# Patient Record
Sex: MALE | Race: BLACK OR AFRICAN AMERICAN | NOT HISPANIC OR LATINO | Employment: FULL TIME | URBAN - METROPOLITAN AREA
[De-identification: names, ages, dates, MRNs, and addresses within clinical notes are randomized per-mention and may not be internally consistent; named-entity substitution may affect disease eponyms.]

---

## 2017-08-15 ENCOUNTER — OFFICE VISIT (OUTPATIENT)
Dept: URGENT CARE | Facility: CLINIC | Age: 57
End: 2017-08-15
Payer: MEDICAID

## 2017-08-15 VITALS
RESPIRATION RATE: 20 BRPM | HEIGHT: 74 IN | WEIGHT: 225 LBS | TEMPERATURE: 97 F | OXYGEN SATURATION: 97 % | HEART RATE: 55 BPM | DIASTOLIC BLOOD PRESSURE: 57 MMHG | SYSTOLIC BLOOD PRESSURE: 104 MMHG | BODY MASS INDEX: 28.88 KG/M2

## 2017-08-15 DIAGNOSIS — M25.512 ACUTE PAIN OF LEFT SHOULDER: Primary | ICD-10-CM

## 2017-08-15 DIAGNOSIS — S16.1XXA CERVICAL STRAIN, ACUTE, INITIAL ENCOUNTER: ICD-10-CM

## 2017-08-15 PROCEDURE — 3008F BODY MASS INDEX DOCD: CPT | Mod: S$GLB,,, | Performed by: NURSE PRACTITIONER

## 2017-08-15 PROCEDURE — 99203 OFFICE O/P NEW LOW 30 MIN: CPT | Mod: S$GLB,,, | Performed by: NURSE PRACTITIONER

## 2017-08-15 RX ORDER — NAPROXEN 500 MG/1
500 TABLET ORAL 2 TIMES DAILY WITH MEALS
Qty: 20 TABLET | Refills: 0 | Status: SHIPPED | OUTPATIENT
Start: 2017-08-15 | End: 2017-08-25

## 2017-08-15 RX ORDER — KETOROLAC TROMETHAMINE 30 MG/ML
30 INJECTION, SOLUTION INTRAMUSCULAR; INTRAVENOUS
Status: COMPLETED | OUTPATIENT
Start: 2017-08-15 | End: 2017-08-15

## 2017-08-15 RX ORDER — METAXALONE 800 MG/1
800 TABLET ORAL 3 TIMES DAILY PRN
Qty: 30 TABLET | Refills: 1 | Status: SHIPPED | OUTPATIENT
Start: 2017-08-15 | End: 2017-08-25

## 2017-08-15 RX ORDER — TRIPROLIDINE/PSEUDOEPHEDRINE 2.5MG-60MG
TABLET ORAL EVERY 6 HOURS PRN
COMMUNITY
End: 2018-03-29

## 2017-08-15 RX ORDER — KETOROLAC TROMETHAMINE 30 MG/ML
30 INJECTION, SOLUTION INTRAMUSCULAR; INTRAVENOUS
Status: DISCONTINUED | OUTPATIENT
Start: 2017-08-15 | End: 2017-08-15

## 2017-08-15 RX ADMIN — KETOROLAC TROMETHAMINE 30 MG: 30 INJECTION, SOLUTION INTRAMUSCULAR; INTRAVENOUS at 05:08

## 2017-08-15 NOTE — PATIENT INSTRUCTIONS
Understanding Cervical Strain    There are 7 bones (vertebrae) in the neck that are part of the spine. These are called the cervical spine. Cervical strain is a medical term for neck pain. The neck has several layers of muscles. These are connected with tendons to the cervical spine and other bones. Neck pain is often the result of injury to these muscles and tendons.  Causes of cervical strain  Different types of stress on the neck can damage muscles and tendons (soft tissues) and cause cervical strain. Cervical tissues can be damaged by:  · The neck being forced past its normal range of motion, such as in a car accident or sports injury  · Constant, low-level stress, such as from poor posture or a poorly set-up workspace  Symptoms of cervical strain  These may include:  · Neck pain or stiffness  · Pain in the shoulders or upper back  · Muscle spasms  · Headache, often starting at the base of the neck  · Irritability, difficulty concentrating, or sleeplessness  Treatment for cervical strain  This problem often gets better on its own. Treatments aim to reduce pain and inflammation and increase the range of motion of the neck. Possible treatments include:  · Over-the-counter or prescription pain medicine. These help relieve pain and inflammation.  · Stretching exercises to decrease neck stiffness.  · Massage to decrease neck stiffness.  · Cold or heat pack. These help reduce pain and swelling.  Call 911  Call emergency services right away if you have any of these:  · Face drooping or numbness  · Numbness or weakness, especially in the arms or on one side  · Slurred speech or difficulty speaking  · Blurred vision   When to call your healthcare provider  Call your healthcare provider right away if you have any of these:  · Fever of 100.4°F (38°C) or higher, or as directed  · Pain or stiffness that gets worse  · Symptoms that dont get better, or get worse  · Numbness, tingling, weakness or shooting pains into the  arms or legs  · New symptoms  Date Last Reviewed: 3/10/2016  © 0354-5368 [employer]. [address] [place] [address], [place] [address]. All rights reserved. This information is not intended as a substitute for professional medical care. Always follow your healthcare professional's instructions.        Shoulder Contusion  You have a shoulder injury called a contusion. This causes pain, swelling, and sometimes bruising on the skin. You dont have any broken bones. This injury will take from a few days to several weeks to heal, depending on how severe it is. Moderate to severe shoulder contusions are treated with a sling or shoulder immobilizer. Minor contusions can be treated without any special support.  Home care  Follow these tips when caring for yourself at home:  · If you were given a sling to use, leave it in place for the time advised by your healthcare provider. If you arent sure how long to wear it, ask for advice. If the sling becomes loose, adjust it so that your forearm is level with the ground. Your shoulder should feel well supported.  · Put an ice pack on the injured area for 20 minutes every 1 to 2 hours the first day. You can make your own ice pack by putting ice cubes in a plastic bag. Wrap the bag in a thin towel. Continue with ice packs 3 to 4 times a day for the next 2 days. Then use the pack as needed to ease pain and swelling.  · You may use acetaminophen or ibuprofen to control pain, unless another pain medicine was prescribed. If you have chronic liver or kidney disease, talk with your healthcare provider before using these medicines. Also talk with your provider if youve ever had a stomach ulcer or GI bleeding.  · Shoulder and elbow joints become stiff if left in a sling for too long. You should start range of motion exercises about 7 to 10 days after the injury. Talk with your provider to find out what type of exercises to do and how soon to start.  · Unless your provider told you  otherwise, you can take the sling off to shower or bathe.  Follow-up care  Follow up with your healthcare provider if you dont start getting better in the next 5 days.  When to seek medical advice  Call your healthcare provider right away if any of these occur:  · Pain or swelling gets worse or continues for more than a few days  · Large amount of bruising on your shoulder or upper arm  · Your hand or fingers become cold, blue, numb, or tingly  · Difficulty moving your hand or fingers  · Weakness in your hand or fingers  · Your shoulder becomes stiff  · Your shoulder feels like it is popping out  · You arent able to do your daily activities  Date Last Reviewed: 10/1/2016  © 0895-7481 [employer]. [address] [place] [address], [place] [address]. All rights reserved. This information is not intended as a substitute for professional medical care. Always follow your healthcare professional's instructions.      Please return here or go to the Emergency Department for any concerns or worsening of condition.  If you were prescribed antibiotics, please take them to completion.  If you were prescribed a narcotic medication, do not drive or operate heavy equipment or machinery while taking these medications.  Please follow up with your primary care doctor or specialist as needed.    If you  smoke, please stop smoking.

## 2017-08-15 NOTE — PROGRESS NOTES
"Subjective:       Patient ID: Bear Campa is a 56 y.o. male.    Vitals:  height is 6' 2" (1.88 m) and weight is 102.1 kg (225 lb). His tympanic temperature is 96.9 °F (36.1 °C). His blood pressure is 104/57 (abnormal) and his pulse is 55 (abnormal). His respiration is 20 and oxygen saturation is 97%.     Chief Complaint: Shoulder Pain    This is a 56 y.o. male with History reviewed. No pertinent past medical history.    who presents today with a chief complaint of left shoulder pain.         Shoulder Pain    The pain is present in the left shoulder. The current episode started in the past 7 days. The problem occurs constantly. The pain is at a severity of 10/10. The pain is severe. Associated symptoms include a limited range of motion. Pertinent negatives include no fever or headaches. He has tried NSAIDS for the symptoms. The treatment provided no relief.     Review of Systems   Constitution: Negative for chills and fever.   HENT: Negative for headaches and sore throat.    Eyes: Negative for blurred vision.   Cardiovascular: Negative for chest pain.   Respiratory: Negative for shortness of breath.    Skin: Negative for rash.   Musculoskeletal: Positive for joint pain. Negative for back pain.   Gastrointestinal: Negative for abdominal pain, diarrhea, nausea and vomiting.   Psychiatric/Behavioral: The patient is not nervous/anxious.        Objective:      Physical Exam   Constitutional: He is oriented to person, place, and time. He appears well-developed and well-nourished. He is cooperative.  Non-toxic appearance. He does not appear ill. No distress.   HENT:   Head: Normocephalic and atraumatic. Head is without abrasion, without contusion and without laceration.   Right Ear: Hearing, tympanic membrane, external ear and ear canal normal. No hemotympanum.   Left Ear: Hearing, tympanic membrane, external ear and ear canal normal. No hemotympanum.   Nose: Nose normal. No mucosal edema, rhinorrhea or nasal " deformity. No epistaxis. Right sinus exhibits no maxillary sinus tenderness and no frontal sinus tenderness. Left sinus exhibits no maxillary sinus tenderness and no frontal sinus tenderness.   Mouth/Throat: Uvula is midline, oropharynx is clear and moist and mucous membranes are normal. No trismus in the jaw. Normal dentition. No uvula swelling. No posterior oropharyngeal erythema.   Eyes: Conjunctivae, EOM and lids are normal. Pupils are equal, round, and reactive to light. Right eye exhibits no discharge. Left eye exhibits no discharge. No scleral icterus.   Sclera clear bilat   Neck: Trachea normal, normal range of motion, full passive range of motion without pain and phonation normal. Neck supple. No spinous process tenderness and no muscular tenderness present. No neck rigidity. No tracheal deviation present.   Cardiovascular: Normal rate, regular rhythm, normal heart sounds, intact distal pulses and normal pulses.    Pulmonary/Chest: Effort normal and breath sounds normal. No respiratory distress.   Abdominal: Soft. Normal appearance and bowel sounds are normal. He exhibits no distension, no pulsatile midline mass and no mass. There is no tenderness.   Musculoskeletal: He exhibits no edema or deformity.        Right shoulder: He exhibits pain. He exhibits no tenderness.        Cervical back: He exhibits decreased range of motion, tenderness (LEFT CERVICAL AREA) and pain.   Neurological: He is alert and oriented to person, place, and time. He has normal strength. No cranial nerve deficit or sensory deficit. He exhibits normal muscle tone. He displays no seizure activity. Coordination normal. GCS eye subscore is 4. GCS verbal subscore is 5. GCS motor subscore is 6.   Skin: Skin is warm, dry and intact. Capillary refill takes less than 2 seconds. No abrasion, no bruising, no burn, no ecchymosis and no laceration noted. He is not diaphoretic. No pallor.   Psychiatric: He has a normal mood and affect. His speech  is normal and behavior is normal. Judgment and thought content normal. Cognition and memory are normal.   Nursing note and vitals reviewed.    X-ray images reviewed prior to patient discharge. Full report to be interpreted by Radiologist. Appropriate action to be taken if abnormalities noted.   Assessment:       1. Acute pain of left shoulder    2. Cervical strain, acute, initial encounter        Plan:         Acute pain of left shoulder  -     X-Ray Shoulder 2 or More Views Left; Future; Expected date: 08/15/2017  -     ketorolac injection 30 mg; Inject 30 mg into the vein one time.  -     naproxen (NAPROSYN) 500 MG tablet; Take 1 tablet (500 mg total) by mouth 2 (two) times daily with meals.  Dispense: 20 tablet; Refill: 0    Cervical strain, acute, initial encounter  -     ketorolac injection 30 mg; Inject 30 mg into the vein one time.  -     metaxalone (SKELAXIN) 800 MG tablet; Take 1 tablet (800 mg total) by mouth 3 (three) times daily as needed for Pain.  Dispense: 30 tablet; Refill: 1    Please return here or go to the Emergency Department for any concerns or worsening of condition.  If you were prescribed antibiotics, please take them to completion.  If you were prescribed a narcotic medication, do not drive or operate heavy equipment or machinery while taking these medications.  Please follow up with your primary care doctor or specialist as needed.    If you  smoke, please stop smoking.

## 2018-03-10 ENCOUNTER — OFFICE VISIT (OUTPATIENT)
Dept: URGENT CARE | Facility: CLINIC | Age: 58
End: 2018-03-10
Payer: MEDICAID

## 2018-03-10 VITALS
WEIGHT: 205 LBS | SYSTOLIC BLOOD PRESSURE: 112 MMHG | RESPIRATION RATE: 12 BRPM | TEMPERATURE: 98 F | HEART RATE: 71 BPM | DIASTOLIC BLOOD PRESSURE: 67 MMHG | OXYGEN SATURATION: 99 % | HEIGHT: 73 IN | BODY MASS INDEX: 27.17 KG/M2

## 2018-03-10 DIAGNOSIS — M54.12 CERVICAL RADICULOPATHY: Primary | ICD-10-CM

## 2018-03-10 PROCEDURE — 99214 OFFICE O/P EST MOD 30 MIN: CPT | Mod: S$GLB,,, | Performed by: PHYSICIAN ASSISTANT

## 2018-03-10 RX ORDER — METHYLPREDNISOLONE 4 MG/1
TABLET ORAL
Qty: 1 PACKAGE | Refills: 0 | Status: SHIPPED | OUTPATIENT
Start: 2018-03-10 | End: 2018-03-29

## 2018-03-10 NOTE — PROGRESS NOTES
"Subjective:       Patient ID: Bear Campa is a 57 y.o. male.    Vitals:  height is 6' 1" (1.854 m) and weight is 93 kg (205 lb). His tympanic temperature is 98.1 °F (36.7 °C). His blood pressure is 112/67 and his pulse is 71. His respiration is 12 and oxygen saturation is 99%.     Chief Complaint: Arm Pain    The patient is a 57 y.o. male with left sided arm pain, tingling and numbness. Patient reports it started in his neck/shoulder area and traveling down to his left hand to pinky, ring and middle finger. Patient reports the numbness is intermittent. Patient states this began two weeks ago while he was lifting heavy object.     Denies taking any medication for pain or numbness. Denies this issue has ever occurred before.    PMH:  - Denies    Meds:  - Denies    NKDA            Arm Pain    The incident occurred more than 1 week ago. The incident occurred at home. There was no injury mechanism. The pain is at a severity of 10/10. The pain is severe. Associated symptoms include numbness (left side, travels from shoulder down to hand) and tingling. Pertinent negatives include no chest pain. The symptoms are aggravated by movement.     Review of Systems   Constitution: Negative for chills and fever.   HENT: Negative for sore throat.    Eyes: Negative for blurred vision.   Cardiovascular: Negative for chest pain.   Respiratory: Negative for shortness of breath.    Skin: Negative for rash.   Musculoskeletal: Negative for back pain and joint pain.   Gastrointestinal: Negative for abdominal pain, diarrhea, nausea and vomiting.   Neurological: Positive for numbness (left side, travels from shoulder down to hand), paresthesias (L upper extremity ) and tingling. Negative for headaches.   Psychiatric/Behavioral: The patient is not nervous/anxious.        Objective:      Physical Exam   Constitutional: He is oriented to person, place, and time. He appears well-developed and well-nourished. He is cooperative.  Non-toxic " appearance. He does not appear ill. No distress.   HENT:   Head: Normocephalic and atraumatic.   Right Ear: Hearing, tympanic membrane, external ear and ear canal normal.   Left Ear: Hearing, tympanic membrane, external ear and ear canal normal.   Nose: Nose normal. No mucosal edema, rhinorrhea or nasal deformity. No epistaxis. Right sinus exhibits no maxillary sinus tenderness and no frontal sinus tenderness. Left sinus exhibits no maxillary sinus tenderness and no frontal sinus tenderness.   Mouth/Throat: Uvula is midline, oropharynx is clear and moist and mucous membranes are normal. No trismus in the jaw. Normal dentition. No uvula swelling. No posterior oropharyngeal erythema.   Eyes: Conjunctivae and lids are normal. Right eye exhibits no discharge. Left eye exhibits no discharge. No scleral icterus.   Sclera clear bilat   Neck: Trachea normal, normal range of motion, full passive range of motion without pain and phonation normal. Neck supple.   Cardiovascular: Normal rate, regular rhythm, normal heart sounds, intact distal pulses and normal pulses.    Pulmonary/Chest: Effort normal and breath sounds normal. No respiratory distress.   Abdominal: Soft. Normal appearance and bowel sounds are normal. He exhibits no distension, no pulsatile midline mass and no mass. There is no tenderness.   Musculoskeletal: Normal range of motion. He exhibits no edema or deformity.        Left shoulder: Normal. He exhibits normal range of motion, no tenderness and no deformity.        Left elbow: Normal.        Left wrist: Normal. He exhibits normal range of motion.   Normal abduction and adduction of shoulder, forearm and hand.     Normal finger-thumb opposition     Neurological: He is alert and oriented to person, place, and time. He exhibits normal muscle tone. Coordination normal.   Skin: Skin is warm, dry and intact. He is not diaphoretic. No pallor.   Psychiatric: He has a normal mood and affect. His speech is normal and  behavior is normal. Judgment and thought content normal. Cognition and memory are normal.   Nursing note and vitals reviewed.      Assessment:       1. Cervical radiculopathy        Plan:         Cervical radiculopathy  -     methylPREDNISolone (MEDROL DOSEPACK) 4 mg tablet; use as directed  Dispense: 1 Package; Refill: 0  -     Ambulatory referral to Internal Medicine      Patient Instructions     Please return here or go to the Emergency Department for any concerns or worsening of condition.  Please follow up with your primary care doctor or specialist as needed.    If you  smoke, please stop smoking.        General Neck and Back Pain    Both neck and back pain are usually caused by injury to the muscles or ligaments of the spine. Sometimes the disks that separate each bone of the spine may cause pain by pressing on a nearby nerve. Back and neck pain may appear after a sudden twisting or bending force (such as in a car accident), or sometimes after a simple awkward movement. In either case, muscle spasm is often present and adds to the pain.  Acute neck and back pain usually gets better in 1 to 2 weeks. Pain related to disk disease, arthritis in the spinal joints or spinal stenosis (narrowing of the spinal canal) can become chronic and last for months or years.  Back and neck pain are common problems. Most people feel better in 1 or 2 weeks, and most of the rest in 1 to 2 months. Most people can remain active.  People experience and describe pain differently.  · Pain can be sharp, stabbing, shooting, aching, cramping, or burning  · Movement, standing, bending, lifting, sitting, or walking may worsen the pain  · Pain can be localized to one spot or area, or it can be more generalized  · Pain can spread or radiate upwards, downwards, to the front, or go down your arms  · Muscle spasm may occur.  Most of the time mechanical problems with the muscles or spine cause the pain. it is usually caused by an injury, whether  known or not, to the muscles or ligaments. While illnesses can cause back pain, it is usually not caused by a serious illness. Pain is usually related to physical activity, whether sports, exercise, work, or normal activity. Sometimes it can occur without an identifiable cause. This can happen simply by stretching or moving wrong, without noting pain at the time. Other causes include:  · Overexertion, lifting, pushing, pulling incorrectly or too aggressively.  · Sudden twisting, bending or stretching from an accident (car or fall), or accidental movement.  · Poor posture  · Poor conditioning, lack of regular exercise  · Spinal disc disease or arthritis  · Stress  · Pregnancy, or illness like appendicitis, bladder or kidney infection, pelvic infections   Home care  · For neck pain: Use a comfortable pillow that supports the head and keeps the spine in a neutral position. The position of the head should not be tilted forward or backward.  · When in bed, try to find a position of comfort. A firm mattress is best. Try lying flat on your back with pillows under your knees. You can also try lying on your side with your knees bent up towards your chest and a pillow between your knees.  · At first, do not try to stretch out the sore spots. If there is a strain, it is not like the good soreness you get after exercising without an injury. In this case, stretching may make it worse.  · Avoid prolonged sitting, long car rides or travel. This puts more stress on the lower back than standing or walking.  · During the first 24 to 72 hours after an injury, apply an ice pack to the painful area for 20 minutes and then remove it for 20 minutes over a period of 60 to 90 minutes or several times a day.   · You can alternate ice and heat therapies. Talk with your healthcare provider about the best treatment for your back or neck pain. As a safety precaution, do not use a heating pad at bedtime. Sleeping with a heating pad can lead to  skin burns or tissue damage.  · Therapeutic massage can help relax the back and neck muscles without stretching them.  · Be aware of safe lifting methods and do not lift anything over 15 pounds until all the pain is gone.  Medications  Talk to your healthcare provider before using medicine, especially if you have other medical problems or are taking other medicines.  · You may use over-the-counter medicine to control pain, unless another pain medicine was prescribed. If you have chronic conditions like diabetes, liver or kidney disease, stomach ulcers,  gastrointestinal bleeding, or are taking blood thinner medicines.  · Be careful if you are given pain medicines, narcotics, or medicine for muscle spasm. They can cause drowsiness, and can affect your coordination, reflexes, and judgment. Do not drive or operate heavy machinery.  Follow-up care  Follow up with your healthcare provider, or as advised. Physical therapy or further tests may be needed.  If X-rays were taken, you will be notified of any new findings that may affect your care.  Call 911  Seek emergency medical care if any of the following occur:  · Trouble breathing  · Confusion  · Very drowsy or trouble awakening  · Fainting or loss of consciousness  · Rapid or very slow heart rate  · Loss of bowel or bladder control  When to seek medical advice  Call your healthcare provider right away if any of these occur:  · Pain becomes worse or spreads into your arms or legs  · Weakness, numbness or pain in one or both arms or legs  · Numbness in the groin area  · Difficulty walking  · Fever of 100.4ºF (38ºC) or higher, or as directed by your healthcare provider  Date Last Reviewed: 7/1/2016  © 0111-8350 [employer], [employer]. [address] [place] [address], [place] [address]. All rights reserved. This information is not intended as a substitute for professional medical care. Always follow your healthcare professional's instructions.        Understanding Cervical  Radiculopathy    Cervical radiculopathy is irritation or inflammation of a nerve root in the neck. It causes neck pain and other symptoms that may spread into the chest or down the arm. To understand this condition, it helps to understand the parts of the spine:  · Vertebrae. These are bones that stack to form the spine. The cervical spine contains the 7 vertebrae in the neck.  · Disks. These are soft pads of tissue between the vertebrae. They act as shock absorbers for the spine.  · The spinal canal. This is a tunnel formed within the stacked vertebrae. The spinal cord runs through this canal.  · Nerves. These branch off the spinal cord. As they leave the spinal canal, nerves pass through openings between the vertebrae. The nerve root is the part of the nerve that is closest to the spinal cord.   With cervical radiculopathy, nerve roots in the neck become irritated. This leads to pain and symptoms that can travel to the nerves that go from the spinal cord down the arms and into the torso.  What causes cervical radiculopathy?  Aging, injury, poor posture, and other issues can lead to problems in the neck. These problems may then irritate nerve roots. These include:  · Damage to a disk in the cervical spine. The damaged disk may then press on nearby nerve roots.  · Degeneration from wear and tear, and aging. This can lead to narrowing (stenosis) of the openings between the vertebrae. The narrowed openings press on nerve roots as they leave the spinal canal.  · An unstable spine. This is when a vertebra slips forward. It can then press on a nerve root.  There are other, less common causes of pressure on nerves in the neck. These include infection, cysts, and tumors.  Symptoms of cervical radiculopathy  These include:  · Neck pain  · Pain, numbness, tingling, or weakness that travels down the arm  · Loss of neck movement  · Muscle spasms  Treatment for cervical radiculopathy  In most cases, your healthcare provider  will first try treatments that help relieve symptoms. These may include:  · Prescription or over-the-counter pain medicines. These help relieve pain and swelling.  · Cold packs. These help reduce pain.  · Resting. This involves avoiding positions and activities that increase pain.  · Neck brace (cervical collar). This can help relieve inflammation and pain.  · Physical therapy, including exercises and stretches. This can help decrease pain and increase movement and function.  · Shots of medicinesaround the nerve roots. This is done to help relieve symptoms for a time.  In some cases, your healthcare provider may advise surgery to fix the underlying problem. This depends on the cause, the symptoms, and how long the pain has lasted.  Possible complications  Over time, an irritated and inflamed nerve may become damaged. This may lead to long-lasting (permanent) numbness or weakness. If symptoms change suddenly or get worse, be sure to let your healthcare provider know.     When to call your healthcare provider  Call your healthcare provider right away if you have any of these:  · New pain or pain that gets worse  · New or increasing weakness, numbness, or tingling in your arm or hand  · Bowel or bladder changes   Date Last Reviewed: 3/10/2016  © 9912-4072 [employer]. [address] [place] [address], [place] [address]. All rights reserved. This information is not intended as a substitute for professional medical care. Always follow your healthcare professional's instructions.

## 2018-03-10 NOTE — PATIENT INSTRUCTIONS
Please return here or go to the Emergency Department for any concerns or worsening of condition.  Please follow up with your primary care doctor or specialist as needed.    If you  smoke, please stop smoking.        General Neck and Back Pain    Both neck and back pain are usually caused by injury to the muscles or ligaments of the spine. Sometimes the disks that separate each bone of the spine may cause pain by pressing on a nearby nerve. Back and neck pain may appear after a sudden twisting or bending force (such as in a car accident), or sometimes after a simple awkward movement. In either case, muscle spasm is often present and adds to the pain.  Acute neck and back pain usually gets better in 1 to 2 weeks. Pain related to disk disease, arthritis in the spinal joints or spinal stenosis (narrowing of the spinal canal) can become chronic and last for months or years.  Back and neck pain are common problems. Most people feel better in 1 or 2 weeks, and most of the rest in 1 to 2 months. Most people can remain active.  People experience and describe pain differently.  · Pain can be sharp, stabbing, shooting, aching, cramping, or burning  · Movement, standing, bending, lifting, sitting, or walking may worsen the pain  · Pain can be localized to one spot or area, or it can be more generalized  · Pain can spread or radiate upwards, downwards, to the front, or go down your arms  · Muscle spasm may occur.  Most of the time mechanical problems with the muscles or spine cause the pain. it is usually caused by an injury, whether known or not, to the muscles or ligaments. While illnesses can cause back pain, it is usually not caused by a serious illness. Pain is usually related to physical activity, whether sports, exercise, work, or normal activity. Sometimes it can occur without an identifiable cause. This can happen simply by stretching or moving wrong, without noting pain at the time. Other causes  include:  · Overexertion, lifting, pushing, pulling incorrectly or too aggressively.  · Sudden twisting, bending or stretching from an accident (car or fall), or accidental movement.  · Poor posture  · Poor conditioning, lack of regular exercise  · Spinal disc disease or arthritis  · Stress  · Pregnancy, or illness like appendicitis, bladder or kidney infection, pelvic infections   Home care  · For neck pain: Use a comfortable pillow that supports the head and keeps the spine in a neutral position. The position of the head should not be tilted forward or backward.  · When in bed, try to find a position of comfort. A firm mattress is best. Try lying flat on your back with pillows under your knees. You can also try lying on your side with your knees bent up towards your chest and a pillow between your knees.  · At first, do not try to stretch out the sore spots. If there is a strain, it is not like the good soreness you get after exercising without an injury. In this case, stretching may make it worse.  · Avoid prolonged sitting, long car rides or travel. This puts more stress on the lower back than standing or walking.  · During the first 24 to 72 hours after an injury, apply an ice pack to the painful area for 20 minutes and then remove it for 20 minutes over a period of 60 to 90 minutes or several times a day.   · You can alternate ice and heat therapies. Talk with your healthcare provider about the best treatment for your back or neck pain. As a safety precaution, do not use a heating pad at bedtime. Sleeping with a heating pad can lead to skin burns or tissue damage.  · Therapeutic massage can help relax the back and neck muscles without stretching them.  · Be aware of safe lifting methods and do not lift anything over 15 pounds until all the pain is gone.  Medications  Talk to your healthcare provider before using medicine, especially if you have other medical problems or are taking other medicines.  · You may  use over-the-counter medicine to control pain, unless another pain medicine was prescribed. If you have chronic conditions like diabetes, liver or kidney disease, stomach ulcers,  gastrointestinal bleeding, or are taking blood thinner medicines.  · Be careful if you are given pain medicines, narcotics, or medicine for muscle spasm. They can cause drowsiness, and can affect your coordination, reflexes, and judgment. Do not drive or operate heavy machinery.  Follow-up care  Follow up with your healthcare provider, or as advised. Physical therapy or further tests may be needed.  If X-rays were taken, you will be notified of any new findings that may affect your care.  Call 911  Seek emergency medical care if any of the following occur:  · Trouble breathing  · Confusion  · Very drowsy or trouble awakening  · Fainting or loss of consciousness  · Rapid or very slow heart rate  · Loss of bowel or bladder control  When to seek medical advice  Call your healthcare provider right away if any of these occur:  · Pain becomes worse or spreads into your arms or legs  · Weakness, numbness or pain in one or both arms or legs  · Numbness in the groin area  · Difficulty walking  · Fever of 100.4ºF (38ºC) or higher, or as directed by your healthcare provider  Date Last Reviewed: 7/1/2016  © 2817-8901 [employer]. [address] [place] [address], [place] [address]. All rights reserved. This information is not intended as a substitute for professional medical care. Always follow your healthcare professional's instructions.        Understanding Cervical Radiculopathy    Cervical radiculopathy is irritation or inflammation of a nerve root in the neck. It causes neck pain and other symptoms that may spread into the chest or down the arm. To understand this condition, it helps to understand the parts of the spine:  · Vertebrae. These are bones that stack to form the spine. The cervical spine contains the 7 vertebrae in the  neck.  · Disks. These are soft pads of tissue between the vertebrae. They act as shock absorbers for the spine.  · The spinal canal. This is a tunnel formed within the stacked vertebrae. The spinal cord runs through this canal.  · Nerves. These branch off the spinal cord. As they leave the spinal canal, nerves pass through openings between the vertebrae. The nerve root is the part of the nerve that is closest to the spinal cord.   With cervical radiculopathy, nerve roots in the neck become irritated. This leads to pain and symptoms that can travel to the nerves that go from the spinal cord down the arms and into the torso.  What causes cervical radiculopathy?  Aging, injury, poor posture, and other issues can lead to problems in the neck. These problems may then irritate nerve roots. These include:  · Damage to a disk in the cervical spine. The damaged disk may then press on nearby nerve roots.  · Degeneration from wear and tear, and aging. This can lead to narrowing (stenosis) of the openings between the vertebrae. The narrowed openings press on nerve roots as they leave the spinal canal.  · An unstable spine. This is when a vertebra slips forward. It can then press on a nerve root.  There are other, less common causes of pressure on nerves in the neck. These include infection, cysts, and tumors.  Symptoms of cervical radiculopathy  These include:  · Neck pain  · Pain, numbness, tingling, or weakness that travels down the arm  · Loss of neck movement  · Muscle spasms  Treatment for cervical radiculopathy  In most cases, your healthcare provider will first try treatments that help relieve symptoms. These may include:  · Prescription or over-the-counter pain medicines. These help relieve pain and swelling.  · Cold packs. These help reduce pain.  · Resting. This involves avoiding positions and activities that increase pain.  · Neck brace (cervical collar). This can help relieve inflammation and pain.  · Physical  therapy, including exercises and stretches. This can help decrease pain and increase movement and function.  · Shots of medicinesaround the nerve roots. This is done to help relieve symptoms for a time.  In some cases, your healthcare provider may advise surgery to fix the underlying problem. This depends on the cause, the symptoms, and how long the pain has lasted.  Possible complications  Over time, an irritated and inflamed nerve may become damaged. This may lead to long-lasting (permanent) numbness or weakness. If symptoms change suddenly or get worse, be sure to let your healthcare provider know.     When to call your healthcare provider  Call your healthcare provider right away if you have any of these:  · New pain or pain that gets worse  · New or increasing weakness, numbness, or tingling in your arm or hand  · Bowel or bladder changes   Date Last Reviewed: 3/10/2016  © 3618-9290 [employer]. [address] [place] [address], [place] [address]. All rights reserved. This information is not intended as a substitute for professional medical care. Always follow your healthcare professional's instructions.

## 2018-03-29 ENCOUNTER — HOSPITAL ENCOUNTER (EMERGENCY)
Facility: HOSPITAL | Age: 58
Discharge: HOME | End: 2018-03-29
Attending: EMERGENCY MEDICINE
Payer: MEDICAID

## 2018-03-29 VITALS
SYSTOLIC BLOOD PRESSURE: 127 MMHG | HEIGHT: 73 IN | BODY MASS INDEX: 27.83 KG/M2 | RESPIRATION RATE: 18 BRPM | DIASTOLIC BLOOD PRESSURE: 60 MMHG | OXYGEN SATURATION: 99 % | HEART RATE: 63 BPM | WEIGHT: 210 LBS | TEMPERATURE: 98 F

## 2018-03-29 DIAGNOSIS — M54.12 CERVICAL RADICULOPATHY: Primary | ICD-10-CM

## 2018-03-29 PROCEDURE — 99283 EMERGENCY DEPT VISIT LOW MDM: CPT | Mod: ,,, | Performed by: EMERGENCY MEDICINE

## 2018-03-29 PROCEDURE — 99283 EMERGENCY DEPT VISIT LOW MDM: CPT

## 2018-03-29 RX ORDER — KETOROLAC TROMETHAMINE 10 MG/1
10 TABLET, FILM COATED ORAL
Qty: 15 TABLET | Refills: 0 | Status: SHIPPED | OUTPATIENT
Start: 2018-03-29 | End: 2018-04-17

## 2018-03-29 RX ORDER — PREDNISONE 50 MG/1
50 TABLET ORAL DAILY
Qty: 5 TABLET | Refills: 0 | Status: SHIPPED | OUTPATIENT
Start: 2018-03-29 | End: 2018-04-03

## 2018-03-29 NOTE — ED NOTES
"Pt reports chest pain with left arm pain. Pt reports chest pain "all the time". Pt denies SOB , N/V/D and fever with chills.     LOC: The patient is awake, alert and aware of environment with an appropriate affect, the patient is oriented x 3 and speaking appropriately.  APPEARANCE: Patient resting comfortably and in no acute distress, patient is clean and well groomed  SKIN: The skin is warm and dry, color consistent with ethnicity, patient has normal skin turgor and moist mucus membranes, skin intact, no breakdown or bruising noted.  RESPIRATORY: Airway is open and patent, breath sounds clear throughout all lung fields; respirations are spontaneous, patient has a normal effort and rate, no accessory muscle use noted. Pt denies trouble breathing   CARDIAC: Patient has no peripheral edema noted, capillary refill < 3 seconds.    NEUROLOGIC: PERRL, 3 mm bilaterally, eyes open spontaneously, behavior appropriate to situation, follows commands, facial expression symmetrical, bilateral hand grasp equal and even, purposeful motor response noted, normal sensation in all extremities when touched with a finger.    "

## 2018-03-29 NOTE — PROVIDER PROGRESS NOTES - EMERGENCY DEPT.
Encounter Date: 3/29/2018    ED Physician Progress Notes       SCRIBE NOTE: I, Gian Fuentes, am scribing for, and in the presence of,  Dr. Doshi.  Physician Statement: I, Dr. Doshi, personally performed the services described in this documentation as scribed by Gian Fuentes in my presence, and it is both accurate and complete.     Physician Note:   Sinus Bradycardia    EKG - STEMI Decision  Initial Reading: No STEMI present.

## 2018-03-29 NOTE — ED PROVIDER NOTES
Encounter Date: 3/29/2018    SCRIBE #1 NOTE: I, Anahi Covarrubias, am scribing for, and in the presence of,  Dr. Hough . I have scribed the following portions of the note - Other sections scribed: HPI, ROS, PE.       History     Chief Complaint   Patient presents with    Chest Pain     was on steroids for neck pain now L last 2 fingers hurting, denies cardiac hx     Time patient was seen by the provider: 11:54 AM    The patient is a 57 y.o. male with no co-morbidities who presents to the ED with a complaint of acute on chronic  neck pain that radiates down his left arm to his 4th and 5th digit fingers. He was seen at Urgent care and was given steroids. Since he finished the steroids, he now c/o chest pain and the numbness.       The history is provided by the patient.     Review of patient's allergies indicates:  No Known Allergies  No past medical history on file.  No past surgical history on file.  No family history on file.  Social History   Substance Use Topics    Smoking status: Never Smoker    Smokeless tobacco: Never Used    Alcohol use 0.6 oz/week     1 Cans of beer per week     Review of Systems   Constitutional: Negative for fever.   HENT: Negative for sore throat.    Respiratory: Negative for shortness of breath.    Cardiovascular: Positive for chest pain.   Gastrointestinal: Negative for nausea.   Genitourinary: Negative for dysuria.   Musculoskeletal: Positive for neck pain. Negative for back pain.   Skin: Negative for rash.   Neurological: Positive for numbness (left arm, 4th and 5th digit on left hand ). Negative for weakness.   Hematological: Does not bruise/bleed easily.       Physical Exam     Initial Vitals [03/29/18 1118]   BP Pulse Resp Temp SpO2   127/60 63 18 98.2 °F (36.8 °C) 99 %      MAP       82.33         Physical Exam    Vitals reviewed.  Constitutional: He appears well-developed and well-nourished. No distress.   HENT:   Head: Normocephalic and atraumatic.   Mouth/Throat: Oropharynx  is clear and moist.   Eyes: Pupils are equal, round, and reactive to light. No scleral icterus.   Neck: Normal range of motion. Neck supple. No JVD present.   Cardiovascular: Normal rate and regular rhythm. Exam reveals no gallop and no friction rub.    No murmur heard.  Pulmonary/Chest: Breath sounds normal. He has no wheezes. He has no rhonchi. He has no rales.   Abdominal: Soft. Bowel sounds are normal. He exhibits no distension and no mass. There is no tenderness.   Musculoskeletal: He exhibits no edema.   Neurological: He is alert and oriented to person, place, and time.   Skin: Skin is warm and dry. No rash noted.         ED Course   Procedures  Labs Reviewed - No data to display          Medical Decision Making:   History:   Old Medical Records: I decided to obtain old medical records.            Scribe Attestation:   Scribe #1: I performed the above scribed service and the documentation accurately describes the services I performed. I attest to the accuracy of the note.               Clinical Impression:   There were no encounter diagnoses.    Disposition:   Disposition: Discharged  57-year-old male who presents to the ER complaining of many months of left-sided neck pain radiating down his left arm which causes him to have some numbness and no weakness he has been to see doctors for this before has been diagnosed with cervical radiculopathy he has been given a prescription for Medrol Dosepak which helped but he is of course now out of it and the numbness and pain came back.  This is all worse with movement of his neck to the left side.  No chest pain no short of breath no nausea vomiting no diaphoresis never gets exertional chest pain.  On his physical exam on movement of the head to the left shoulder there is some increased pain and numbness in his left arm however the left upper extremities neurovascularly intact distally with good muscle strength good sensation and good pulses.  I will prescribe him  prednisone and Toradol I asked him to get a primary care doctor get a referral to neurosurgery and MRI of his neck and proceed from there.  He is to return to the ER for any other concerns including but not limited to chest pain shows breath nausea vomiting diaphoresis paralysis increased pain and increased numbness.                        Alexander Hough MD  03/29/18 1216       Alexander Hough MD  03/29/18 1216

## 2018-04-17 ENCOUNTER — HOSPITAL ENCOUNTER (EMERGENCY)
Facility: HOSPITAL | Age: 58
Discharge: HOME | End: 2018-04-17
Attending: EMERGENCY MEDICINE
Payer: MEDICAID

## 2018-04-17 VITALS
HEART RATE: 61 BPM | SYSTOLIC BLOOD PRESSURE: 108 MMHG | BODY MASS INDEX: 26.95 KG/M2 | WEIGHT: 210 LBS | TEMPERATURE: 98 F | OXYGEN SATURATION: 98 % | RESPIRATION RATE: 16 BRPM | HEIGHT: 74 IN | DIASTOLIC BLOOD PRESSURE: 55 MMHG

## 2018-04-17 DIAGNOSIS — M25.522 LEFT ELBOW PAIN: Primary | ICD-10-CM

## 2018-04-17 PROCEDURE — 63600175 PHARM REV CODE 636 W HCPCS: Performed by: PHYSICIAN ASSISTANT

## 2018-04-17 PROCEDURE — 99283 EMERGENCY DEPT VISIT LOW MDM: CPT | Mod: 25

## 2018-04-17 PROCEDURE — 96372 THER/PROPH/DIAG INJ SC/IM: CPT

## 2018-04-17 RX ORDER — KETOROLAC TROMETHAMINE 30 MG/ML
15 INJECTION, SOLUTION INTRAMUSCULAR; INTRAVENOUS
Status: COMPLETED | OUTPATIENT
Start: 2018-04-17 | End: 2018-04-17

## 2018-04-17 RX ORDER — ORPHENADRINE CITRATE 30 MG/ML
60 INJECTION INTRAMUSCULAR; INTRAVENOUS
Status: COMPLETED | OUTPATIENT
Start: 2018-04-17 | End: 2018-04-17

## 2018-04-17 RX ORDER — MELOXICAM 15 MG/1
15 TABLET ORAL DAILY
Qty: 7 TABLET | Refills: 0 | Status: SHIPPED | OUTPATIENT
Start: 2018-04-17 | End: 2019-02-14

## 2018-04-17 RX ORDER — METHOCARBAMOL 750 MG/1
1500 TABLET, FILM COATED ORAL 3 TIMES DAILY
Qty: 30 TABLET | Refills: 0 | Status: SHIPPED | OUTPATIENT
Start: 2018-04-17 | End: 2018-04-22

## 2018-04-17 RX ADMIN — KETOROLAC TROMETHAMINE 15 MG: 30 INJECTION, SOLUTION INTRAMUSCULAR at 04:04

## 2018-04-17 RX ADMIN — ORPHENADRINE CITRATE 60 MG: 30 INJECTION INTRAMUSCULAR; INTRAVENOUS at 04:04

## 2018-04-17 NOTE — ED PROVIDER NOTES
Encounter Date: 4/17/2018       History     Chief Complaint   Patient presents with    Elbow Pain     i was having problems with my L elbow and now i bumped it at work,      56 y/o male with history of cervical radiculopathy presents to the ER with chief complaint of left elbow pain since an injury at noon today.  Patient states that he hit his elbow on an iron piece of a dumpster while at work.  He has pain in the elbow radiating to the left forearm and upper arm.  Patient reports that he has been suffering with left-sided neck and arm pain for 2 months, which has been treated as cervical radiculopathy.  He reports prior numbness in the fourth and fifth digits, but feels that now his first 2 digits are now tingling after his injury today.  He denies additional injuries or other complaints at this time.          Review of patient's allergies indicates:  No Known Allergies  Past Medical History:   Diagnosis Date    Cervical radiculopathy      History reviewed. No pertinent surgical history.  History reviewed. No pertinent family history.  Social History   Substance Use Topics    Smoking status: Never Smoker    Smokeless tobacco: Never Used    Alcohol use No     Review of Systems   Constitutional: Negative for chills and fever.   HENT: Negative for sore throat.    Respiratory: Negative for shortness of breath.    Cardiovascular: Negative for chest pain.   Gastrointestinal: Negative for abdominal pain, nausea and vomiting.   Genitourinary: Negative for dysuria.   Musculoskeletal: Positive for arthralgias, myalgias and neck pain. Negative for back pain and joint swelling.   Skin: Negative for rash.   Neurological: Negative for weakness.   Hematological: Does not bruise/bleed easily.   Psychiatric/Behavioral: Negative for confusion.       Physical Exam     Initial Vitals [04/17/18 1631]   BP Pulse Resp Temp SpO2   (!) 108/55 61 16 98.2 °F (36.8 °C) 98 %      MAP       72.67         Physical Exam    Nursing note and  vitals reviewed.  Constitutional: He appears well-developed and well-nourished.   HENT:   Head: Atraumatic.   Mouth/Throat: Oropharynx is clear and moist.   Eyes: Conjunctivae and EOM are normal. Pupils are equal, round, and reactive to light.   Neck: Normal range of motion. Neck supple. Muscular tenderness (left paraspinous) present. No spinous process tenderness present.   Cardiovascular: Normal rate, regular rhythm and intact distal pulses.   Pulmonary/Chest: Breath sounds normal. No respiratory distress. He has no wheezes. He has no rhonchi. He has no rales.   Abdominal: Soft. Bowel sounds are normal. There is no tenderness.   Musculoskeletal:        Left shoulder: He exhibits decreased range of motion (secondary to pain in upper arm) and pain. He exhibits no bony tenderness and no deformity.   Neurological: He is alert and oriented to person, place, and time. He has normal strength. No cranial nerve deficit or sensory deficit.   Skin: No rash noted.   Psychiatric: He has a normal mood and affect.         ED Course   Procedures  Labs Reviewed - No data to display                APC / Resident Notes:   Patient presents to the ER with chief complaint of left elbow pain since an injury this afternoon.    Patient reports that he has been suffering with left-sided neck and arm pain for 2 months, which has been treated as cervical radiculopathy. He notes worsening arm pain and tingling extending to the pointer and middle finger since his injury.     I reviewed the patients chart.  He was seen in Urgent care and ED on 3/10 and 3/29.  He reported that his pain started 2 weeks prior in the neck and upper arm after lifting a heavy object.  He was treated with medrol dosepak, toradol , and prednisone during previous visits with temporary improvement.  He was advised to f/u with neurosurgery and would likely need MRI, but he is having trouble arranging for follow up.    Xray of her left elbow is negative for acute fracture  or dislocation.    He has pain in the shoulder and arm most likely secondary to cervical radiculopathy.  He has no midline spinous process tenderness and is neurovascularly intact on exam.  I do not see an indication for emergent imaging of the spine at this time, but have advised that he f/u with PCP for referral to neurosurgery as he will likely need MRI of the C-spine for further evaluation.  He is provided with community clinic contact #.    I will give sling for him to wear for a few days for treatment of elbow contusion.  I have also prescribed NSAIDs and muscle relaxer for treatment of pain.    He is stable for discharge.   He is given ER return precautions and advised to follow up with PCP within 1 week for ER follow exam.     I discussed the care of this pt with my supervising MD.                   Clinical Impression:   The encounter diagnosis was Left elbow pain.                           MATTHEW Wynn  04/17/18 8745

## 2018-04-17 NOTE — ED NOTES
Patient identifiers verified and correct for MR [facility]  C/C: Left elbow pain   APPEARANCE: awake and alert in NAD.  SKIN: warm, dry and intact. No breakdown or bruising.  MUSCULOSKELETAL: Patient moving all extremities spontaneously, no obvious swelling or deformities noted. Ambulates independently.  RESPIRATORY: Denies shortness of breath.Respirations unlabored.   CARDIAC: Denies CP, 2+ distal pulses; no peripheral edema  ABDOMEN: S/ND/NT, Denies nausea  : voids spontaneously, denies difficulty  Neurologic: AAO x 4; follows commands equal strength in all extremities; denies numbness/tingling. Denies dizziness Pain to left elbow after hitting it on a dumpster at 1200, positive sensation to fingertips, positive mvmt, positive radial pulses.

## 2018-04-17 NOTE — ED TRIAGE NOTES
Patient states left elbow/arm after lifting heavy equipment today at 1200. States he hit it on dumpster. Tylenol 1400.

## 2019-02-14 ENCOUNTER — HOSPITAL ENCOUNTER (EMERGENCY)
Facility: HOSPITAL | Age: 59
Discharge: HOME | End: 2019-02-14
Attending: EMERGENCY MEDICINE
Payer: MEDICAID

## 2019-02-14 VITALS
RESPIRATION RATE: 16 BRPM | BODY MASS INDEX: 23.74 KG/M2 | OXYGEN SATURATION: 95 % | DIASTOLIC BLOOD PRESSURE: 69 MMHG | TEMPERATURE: 98 F | HEIGHT: 74 IN | HEART RATE: 6 BPM | SYSTOLIC BLOOD PRESSURE: 124 MMHG | WEIGHT: 185 LBS

## 2019-02-14 DIAGNOSIS — J10.1 INFLUENZA A: Primary | ICD-10-CM

## 2019-02-14 DIAGNOSIS — R05.9 COUGH: ICD-10-CM

## 2019-02-14 LAB
CTP QC/QA: YES
POC MOLECULAR INFLUENZA A AGN: POSITIVE
POC MOLECULAR INFLUENZA B AGN: NEGATIVE

## 2019-02-14 PROCEDURE — 99284 EMERGENCY DEPT VISIT MOD MDM: CPT | Mod: ,,, | Performed by: NURSE PRACTITIONER

## 2019-02-14 PROCEDURE — 25000003 PHARM REV CODE 250: Performed by: NURSE PRACTITIONER

## 2019-02-14 PROCEDURE — 99283 EMERGENCY DEPT VISIT LOW MDM: CPT

## 2019-02-14 PROCEDURE — 99284 PR EMERGENCY DEPT VISIT,LEVEL IV: ICD-10-PCS | Mod: ,,, | Performed by: NURSE PRACTITIONER

## 2019-02-14 RX ORDER — OSELTAMIVIR PHOSPHATE 75 MG/1
75 CAPSULE ORAL 2 TIMES DAILY
Qty: 10 CAPSULE | Refills: 0 | Status: SHIPPED | OUTPATIENT
Start: 2019-02-14 | End: 2019-02-19

## 2019-02-14 RX ORDER — IBUPROFEN 400 MG/1
800 TABLET ORAL
Status: COMPLETED | OUTPATIENT
Start: 2019-02-14 | End: 2019-02-14

## 2019-02-14 RX ORDER — ACETAMINOPHEN 500 MG
1000 TABLET ORAL
Status: DISCONTINUED | OUTPATIENT
Start: 2019-02-14 | End: 2019-02-14

## 2019-02-14 RX ORDER — OSELTAMIVIR PHOSPHATE 75 MG/1
75 CAPSULE ORAL
Status: COMPLETED | OUTPATIENT
Start: 2019-02-14 | End: 2019-02-14

## 2019-02-14 RX ADMIN — OSELTAMIVIR PHOSPHATE 75 MG: 75 CAPSULE ORAL at 03:02

## 2019-02-14 RX ADMIN — IBUPROFEN 800 MG: 400 TABLET ORAL at 03:02

## 2019-02-14 NOTE — ED PROVIDER NOTES
Encounter Date: 2/14/2019       History     Chief Complaint   Patient presents with    Flu like symptoms     Diarrhea, throat pain, body aches, fevers, chills since yesterday     Pt is a 59 yo male with medical history of cervical radiculopathy presenting to the ED for generalized body aches, sore throat, fever since yesterday.  Pt states his daughter was diagnosed with sinusitis last week.  Pt denies any nausea, vomiting, diarrhea.            Review of patient's allergies indicates:  No Known Allergies  Past Medical History:   Diagnosis Date    Cervical radiculopathy      History reviewed. No pertinent surgical history.  History reviewed. No pertinent family history.  Social History     Tobacco Use    Smoking status: Never Smoker    Smokeless tobacco: Never Used   Substance Use Topics    Alcohol use: No     Alcohol/week: 0.6 oz     Types: 1 Cans of beer per week    Drug use: No     Review of Systems   Constitutional: Positive for activity change, chills, fatigue and fever.   HENT: Positive for congestion, sneezing and sore throat. Negative for sinus pressure and sinus pain.    Respiratory: Positive for cough. Negative for chest tightness, shortness of breath and wheezing.    Cardiovascular: Negative for chest pain and palpitations.   Gastrointestinal: Negative for abdominal pain, constipation, diarrhea, nausea and vomiting.   Genitourinary: Negative for decreased urine volume, difficulty urinating, dysuria and urgency.   Musculoskeletal: Positive for myalgias. Negative for arthralgias and back pain.   Skin: Negative for color change, pallor, rash and wound.   Neurological: Negative for dizziness, syncope, weakness, numbness and headaches.   Hematological: Does not bruise/bleed easily.   All other systems reviewed and are negative.      Physical Exam     Initial Vitals [02/14/19 1433]   BP Pulse Resp Temp SpO2   112/62 88 18 99.5 °F (37.5 °C) (!) 93 %      MAP       --         Physical Exam    Nursing note and  vitals reviewed.  Constitutional: Vital signs are normal. He appears well-developed and well-nourished. He is cooperative. He has a sickly appearance.   HENT:   Head: Normocephalic and atraumatic.   Mouth/Throat: Uvula is midline, oropharynx is clear and moist and mucous membranes are normal.   Eyes: Conjunctivae, EOM and lids are normal. Pupils are equal, round, and reactive to light.   Neck: Trachea normal and phonation normal.   Cardiovascular: Normal rate and regular rhythm.   Pulses:       Radial pulses are 2+ on the right side, and 2+ on the left side.   Pulmonary/Chest: Effort normal. He has decreased breath sounds.   Abdominal: Soft. Normal appearance and bowel sounds are normal. There is no tenderness. There is no rigidity, no rebound and no guarding.   Musculoskeletal: Normal range of motion.   Neurological: He is alert and oriented to person, place, and time. He has normal strength. No cranial nerve deficit or sensory deficit. GCS eye subscore is 4. GCS verbal subscore is 5. GCS motor subscore is 6.   Skin: Skin is warm, dry and intact. Capillary refill takes less than 2 seconds. No abrasion, no laceration and no rash noted. No cyanosis. Nails show no clubbing.         ED Course   Procedures  Labs Reviewed   POCT INFLUENZA A/B MOLECULAR - Abnormal; Notable for the following components:       Result Value    POC Molecular Influenza A Ag Positive (*)     All other components within normal limits          Imaging Results          X-Ray Chest PA And Lateral (Final result)  Result time 02/14/19 15:17:14    Final result by Lei Stack MD (02/14/19 15:17:14)                 Impression:      No acute cardiopulmonary processes.      Electronically signed by: Lei Stack MD  Date:    02/14/2019  Time:    15:17             Narrative:    EXAMINATION:  XR CHEST PA AND LATERAL    CLINICAL HISTORY:  Cough    TECHNIQUE:  PA and lateral views of the chest were performed.    COMPARISON:  None    FINDINGS:  Lungs are  well inflated.  No acute lobar consolidations or pneumothorax or pulmonary vascular congestion or pleural effusion.  Minor chronic interstitial scar-like changes are seen bilaterally.    Cardiomediastinal silhouette is within normal limits.    Visualized bony thorax is unremarkable except for spondylotic changes in the dorsal spine.                                       APC / Resident Notes:   Emergent evaluation of a 57 yo male patient presenting to the ER with chief complaint of generalized body aches, fever, cough, congestion and sore throat since yesterday.  On exam patient A&O x3.  Abdomen soft and nontender.  Breath sounds diminished bilaterally.  Bowel sounds within normal limits. Patient is afebrile on exam.  I will medicate, swab for influenza, chest x-ray and reassess. Differential diagnoses include but are not limited to viral URI including influenza type illness, bronchitis, pneumonia, CHF, or reactive airway disease.  Symptoms are not consistent with diagnosis of pulmonary emboli.  I discussed the care of this patient with my Supervising Physician.      The patient is influenza a positive.  Chest x-ray negative for any acute cardiopulmonary process.  Patient reassessed.  Patient updated on labs and imaging results.  All questions and concerns addressed.    Patient is hemodynamically stable, vital signs are normal. Discharge instructions given. Prescription for Tamiflu given and explained. Return to ED precautions discussed. Follow up as directed. Pt verbalized understanding of this plan. Pt is stable for discharge.                      Clinical Impression:   The primary encounter diagnosis was Influenza A. A diagnosis of Cough was also pertinent to this visit.      Disposition:   Disposition: Discharged  Condition: Stable                        Jess Sam NP  02/14/19 0053

## 2019-02-14 NOTE — ED NOTES
Patient identifiers verified and correct for Mr Campa   C/C: Cough, body aches,  APPEARANCE: awake and alert in NAD.  SKIN: warm, dry and intact. No breakdown or bruising.  MUSCULOSKELETAL: Patient moving all extremities spontaneously, no obvious swelling or deformities noted. Ambulates independently.  RESPIRATORY: Positive  shortness of breath.Respirations unlabored. Positive cough with green secretions, temp to 99.  CARDIAC: Positive CP with cough, 2+ distal pulses; no peripheral edema  ABDOMEN: S/ND/NT, Denies nausea, poor appetitie  : voids spontaneously, denies difficulty  Neurologic: AAO x 4; follows commands equal strength in all extremities; denies numbness/tingling. Denies dizziness Denies  weakness

## 2019-02-14 NOTE — ED TRIAGE NOTES
Patient states body and bone pain, gen all over, positive cough, headache, temp to 99.9, poor appetite.

## 2020-07-29 ENCOUNTER — HOSPITAL ENCOUNTER (EMERGENCY)
Facility: HOSPITAL | Age: 60
Discharge: HOME | End: 2020-07-29
Attending: EMERGENCY MEDICINE
Payer: MEDICAID

## 2020-07-29 VITALS
OXYGEN SATURATION: 99 % | SYSTOLIC BLOOD PRESSURE: 127 MMHG | WEIGHT: 210 LBS | BODY MASS INDEX: 27.83 KG/M2 | TEMPERATURE: 98 F | RESPIRATION RATE: 18 BRPM | DIASTOLIC BLOOD PRESSURE: 65 MMHG | HEIGHT: 73 IN | HEART RATE: 67 BPM

## 2020-07-29 DIAGNOSIS — L73.9 ACUTE FOLLICULITIS: Primary | ICD-10-CM

## 2020-07-29 PROCEDURE — 99284 EMERGENCY DEPT VISIT MOD MDM: CPT | Mod: ER

## 2020-07-29 RX ORDER — FAMOTIDINE 20 MG/1
20 TABLET, FILM COATED ORAL
COMMUNITY
Start: 2020-07-23 | End: 2021-11-18

## 2020-07-29 RX ORDER — SULFAMETHOXAZOLE AND TRIMETHOPRIM 800; 160 MG/1; MG/1
1 TABLET ORAL 2 TIMES DAILY
Qty: 20 TABLET | Refills: 0 | Status: SHIPPED | OUTPATIENT
Start: 2020-07-29 | End: 2020-08-08

## 2020-07-29 RX ORDER — METHYLPREDNISOLONE 4 MG/1
TABLET ORAL
COMMUNITY
Start: 2020-07-23 | End: 2020-07-30

## 2020-07-29 RX ORDER — MUPIROCIN 20 MG/G
OINTMENT TOPICAL 2 TIMES DAILY
Qty: 22 G | Refills: 0 | Status: SHIPPED | OUTPATIENT
Start: 2020-07-29 | End: 2020-08-08

## 2020-07-29 RX ORDER — IBUPROFEN 800 MG/1
800 TABLET ORAL EVERY 6 HOURS PRN
Qty: 20 TABLET | Refills: 0 | Status: SHIPPED | OUTPATIENT
Start: 2020-07-29

## 2020-07-29 RX ORDER — DIPHENHYDRAMINE HCL 25 MG
25 CAPSULE ORAL
COMMUNITY
Start: 2020-07-23 | End: 2020-07-30

## 2020-07-29 RX ORDER — EPINEPHRINE 0.3 MG/.3ML
0.3 INJECTION SUBCUTANEOUS
COMMUNITY
Start: 2020-07-23

## 2020-07-30 NOTE — ED PROVIDER NOTES
Encounter Date: 7/29/2020    SCRIBE #1 NOTE: I, Joan Fajardo, am scribing for, and in the presence of,  Dr. Mitchell . I have scribed the following portions of the note - Other sections scribed: HPI, ROS, PE.       History     Chief Complaint   Patient presents with    Facial Swelling     pt reports he felt like his face was swollen since yesterday after being outside feeding his horses. reports he was seen at [place] yesterday and was given an Epi pen and medrol dose eliot. pt denies using epi pen, reports taking oral medication but denies relief. denies SOB, trouble breathing, CP or palpitations. NAD noted at this time     This is a 59 y.o male who presents to the ED complaining of scalp swelling that started around Tuesday after getting  his hair cut by a new white. He reports irritation and burning while getting his haircut. Was treated at [facility] for allergic reaction. Was given an epi pen and medrol dose pack. Reports his symptoms are worsening so he went back to [facility] today but left without being seen. He denies SOB, trouble swallowing, throat or tongue swelling, or fever. He has not taken any of the medication prescribed.     The history is provided by the patient. No  was used.     Review of patient's allergies indicates:  No Known Allergies  Past Medical History:   Diagnosis Date    Cervical radiculopathy      History reviewed. No pertinent surgical history.  History reviewed. No pertinent family history.  Social History     Tobacco Use    Smoking status: Never Smoker    Smokeless tobacco: Never Used   Substance Use Topics    Alcohol use: No     Alcohol/week: 1.0 standard drinks     Types: 1 Cans of beer per week    Drug use: No     Review of Systems   Constitutional: Negative for fever.   HENT: Negative for trouble swallowing.    Respiratory: Negative for shortness of breath.    Skin: Positive for rash.        +Scalp swelling    All other systems reviewed and are  negative.      Physical Exam     Initial Vitals [07/29/20 2046]   BP Pulse Resp Temp SpO2   (!) 135/56 64 18 97.9 °F (36.6 °C) 100 %      MAP       --         Physical Exam    Nursing note and vitals reviewed.  Constitutional: He appears well-developed and well-nourished.   HENT:   Head: Normocephalic and atraumatic.   Nose: Nose normal.   Mild swelling noted to the left temporal area     Eyes: Conjunctivae are normal.   Neck: Normal range of motion and phonation normal. Neck supple. No stridor present.   Cardiovascular: Normal rate and intact distal pulses.   Pulmonary/Chest: Effort normal. No stridor. No respiratory distress.   Abdominal: Soft. Normal appearance.   Musculoskeletal: Normal range of motion. No tenderness or edema.   Neurological: He is alert and oriented to person, place, and time. Gait normal.   Skin: Skin is warm, dry and intact. Capillary refill takes less than 2 seconds. Rash noted. Rash is maculopapular.        Psychiatric: He has a normal mood and affect. His behavior is normal.         ED Course   Procedures  Labs Reviewed - No data to display       Imaging Results    None                     Scribe Attestation:   Scribe #1: I performed the above scribed service and the documentation accurately describes the services I performed. I attest to the accuracy of the note.      Labs Reviewed       Imaging Reviewed    Imaging Results    None         Medications given in ED    Medications - No data to display    This document was produced by a scribe under my direction and in my presence. I agree with the content of the note and have made any necessary edits.     Bethany Mitchell MD         Note was created using voice recognition software. Note may have occasional typographical errors that may not have been identified and edited despite good babs initial review prior to signing.                        Clinical Impression:     1. Acute folliculitis                ED Disposition Condition    Discharge  Stable        ED Prescriptions     Medication Sig Dispense Start Date End Date Auth. Provider    mupirocin (BACTROBAN) 2 % ointment Apply topically 2 (two) times daily. for 10 days 22 g 7/29/2020 8/8/2020 Bethany Mitchell MD    sulfamethoxazole-trimethoprim 800-160mg (BACTRIM DS) 800-160 mg Tab Take 1 tablet by mouth 2 (two) times daily. for 10 days 20 tablet 7/29/2020 8/8/2020 Bethany Mitchell MD    ibuprofen (ADVIL,MOTRIN) 800 MG tablet Take 1 tablet (800 mg total) by mouth every 6 (six) hours as needed for Pain. 20 tablet 7/29/2020  Bethany Mitchell MD        Follow-up Information     Follow up With Specialties Details Why Contact Info    Your PCP  Call in 1 day to schedule an appointment, for re-evaluation of today's complaint, and ongoing care                                      Bethany Mitchell MD  07/30/20 3722

## 2021-11-18 ENCOUNTER — HOSPITAL ENCOUNTER (EMERGENCY)
Facility: HOSPITAL | Age: 61
Discharge: HOME | End: 2021-11-18
Attending: EMERGENCY MEDICINE
Payer: MEDICAID

## 2021-11-18 VITALS
DIASTOLIC BLOOD PRESSURE: 61 MMHG | RESPIRATION RATE: 16 BRPM | HEIGHT: 73 IN | SYSTOLIC BLOOD PRESSURE: 123 MMHG | OXYGEN SATURATION: 99 % | HEART RATE: 51 BPM | TEMPERATURE: 99 F | WEIGHT: 190 LBS | BODY MASS INDEX: 25.18 KG/M2

## 2021-11-18 DIAGNOSIS — R21 RASH: Primary | ICD-10-CM

## 2021-11-18 DIAGNOSIS — T78.40XA ALLERGIC REACTION, INITIAL ENCOUNTER: ICD-10-CM

## 2021-11-18 PROCEDURE — 25000003 PHARM REV CODE 250: Mod: ER | Performed by: EMERGENCY MEDICINE

## 2021-11-18 PROCEDURE — 96372 THER/PROPH/DIAG INJ SC/IM: CPT | Mod: ER

## 2021-11-18 PROCEDURE — 25000003 PHARM REV CODE 250: Mod: ER | Performed by: PHYSICIAN ASSISTANT

## 2021-11-18 PROCEDURE — 63600175 PHARM REV CODE 636 W HCPCS: Mod: ER | Performed by: EMERGENCY MEDICINE

## 2021-11-18 PROCEDURE — 99284 EMERGENCY DEPT VISIT MOD MDM: CPT | Mod: 25,ER

## 2021-11-18 RX ORDER — DIPHENHYDRAMINE HCL 25 MG
25 CAPSULE ORAL
Status: COMPLETED | OUTPATIENT
Start: 2021-11-18 | End: 2021-11-18

## 2021-11-18 RX ORDER — METHYLPREDNISOLONE SOD SUCC 125 MG
125 VIAL (EA) INJECTION
Status: COMPLETED | OUTPATIENT
Start: 2021-11-18 | End: 2021-11-18

## 2021-11-18 RX ORDER — FAMOTIDINE 20 MG/1
20 TABLET, FILM COATED ORAL
Status: COMPLETED | OUTPATIENT
Start: 2021-11-18 | End: 2021-11-18

## 2021-11-18 RX ORDER — LORATADINE 10 MG/1
10 TABLET ORAL DAILY
Qty: 60 TABLET | Refills: 0 | Status: SHIPPED | OUTPATIENT
Start: 2021-11-18 | End: 2022-11-18

## 2021-11-18 RX ORDER — DIPHENHYDRAMINE HCL 25 MG
25 CAPSULE ORAL EVERY 6 HOURS PRN
Qty: 20 CAPSULE | Refills: 0 | Status: SHIPPED | OUTPATIENT
Start: 2021-11-18

## 2021-11-18 RX ORDER — PREDNISONE 20 MG/1
40 TABLET ORAL DAILY
Qty: 10 TABLET | Refills: 0 | Status: SHIPPED | OUTPATIENT
Start: 2021-11-18 | End: 2021-11-23

## 2021-11-18 RX ORDER — FAMOTIDINE 20 MG/1
20 TABLET, FILM COATED ORAL 2 TIMES DAILY
Qty: 20 TABLET | Refills: 0 | Status: SHIPPED | OUTPATIENT
Start: 2021-11-18 | End: 2022-11-18

## 2021-11-18 RX ORDER — DIPHENHYDRAMINE HYDROCHLORIDE 50 MG/ML
25 INJECTION INTRAMUSCULAR; INTRAVENOUS
Status: COMPLETED | OUTPATIENT
Start: 2021-11-18 | End: 2021-11-18

## 2021-11-18 RX ADMIN — DIPHENHYDRAMINE HYDROCHLORIDE 25 MG: 50 INJECTION INTRAMUSCULAR; INTRAVENOUS at 10:11

## 2021-11-18 RX ADMIN — FAMOTIDINE 20 MG: 20 TABLET, FILM COATED ORAL at 10:11

## 2021-11-18 RX ADMIN — DIPHENHYDRAMINE HYDROCHLORIDE 25 MG: 25 CAPSULE ORAL at 09:11

## 2021-11-18 RX ADMIN — METHYLPREDNISOLONE SODIUM SUCCINATE 125 MG: 125 INJECTION, POWDER, FOR SOLUTION INTRAMUSCULAR; INTRAVENOUS at 10:11

## 2022-11-30 ENCOUNTER — HOSPITAL ENCOUNTER (EMERGENCY)
Facility: HOSPITAL | Age: 62
Discharge: HOME | End: 2022-11-30
Attending: EMERGENCY MEDICINE
Payer: MEDICAID

## 2022-11-30 VITALS
RESPIRATION RATE: 16 BRPM | SYSTOLIC BLOOD PRESSURE: 137 MMHG | DIASTOLIC BLOOD PRESSURE: 64 MMHG | HEART RATE: 50 BPM | TEMPERATURE: 98 F | WEIGHT: 210 LBS | BODY MASS INDEX: 26.95 KG/M2 | HEIGHT: 74 IN | OXYGEN SATURATION: 100 %

## 2022-11-30 DIAGNOSIS — U07.1 COVID-19: Primary | ICD-10-CM

## 2022-11-30 DIAGNOSIS — R05.9 COUGH: ICD-10-CM

## 2022-11-30 LAB
ANION GAP SERPL CALC-SCNC: 7 MMOL/L (ref 8–16)
BASOPHILS # BLD AUTO: 0.04 K/UL (ref 0–0.2)
BASOPHILS NFR BLD: 0.5 % (ref 0–1.9)
BUN SERPL-MCNC: 9 MG/DL (ref 8–23)
CALCIUM SERPL-MCNC: 9.4 MG/DL (ref 8.7–10.5)
CHLORIDE SERPL-SCNC: 103 MMOL/L (ref 95–110)
CO2 SERPL-SCNC: 27 MMOL/L (ref 23–29)
CREAT SERPL-MCNC: 1 MG/DL (ref 0.5–1.4)
DIFFERENTIAL METHOD: NORMAL
EOSINOPHIL # BLD AUTO: 0 K/UL (ref 0–0.5)
EOSINOPHIL NFR BLD: 0.4 % (ref 0–8)
ERYTHROCYTE [DISTWIDTH] IN BLOOD BY AUTOMATED COUNT: 12.7 % (ref 11.5–14.5)
EST. GFR  (NO RACE VARIABLE): >60 ML/MIN/1.73 M^2
GLUCOSE SERPL-MCNC: 92 MG/DL (ref 70–110)
HCT VFR BLD AUTO: 43.5 % (ref 40–54)
HCV AB SERPL QL IA: NORMAL
HGB BLD-MCNC: 14.3 G/DL (ref 14–18)
HIV 1+2 AB+HIV1 P24 AG SERPL QL IA: NORMAL
IMM GRANULOCYTES # BLD AUTO: 0.03 K/UL (ref 0–0.04)
IMM GRANULOCYTES NFR BLD AUTO: 0.4 % (ref 0–0.5)
INFLUENZA A, MOLECULAR: NOT DETECTED
INFLUENZA B, MOLECULAR: NOT DETECTED
LYMPHOCYTES # BLD AUTO: 2 K/UL (ref 1–4.8)
LYMPHOCYTES NFR BLD: 24 % (ref 18–48)
MCH RBC QN AUTO: 27.6 PG (ref 27–31)
MCHC RBC AUTO-ENTMCNC: 32.9 G/DL (ref 32–36)
MCV RBC AUTO: 84 FL (ref 82–98)
MONOCYTES # BLD AUTO: 0.6 K/UL (ref 0.3–1)
MONOCYTES NFR BLD: 7.7 % (ref 4–15)
NEUTROPHILS # BLD AUTO: 5.6 K/UL (ref 1.8–7.7)
NEUTROPHILS NFR BLD: 67 % (ref 38–73)
NRBC BLD-RTO: 0 /100 WBC
PLATELET # BLD AUTO: 263 K/UL (ref 150–450)
PMV BLD AUTO: 10.2 FL (ref 9.2–12.9)
POTASSIUM SERPL-SCNC: 4 MMOL/L (ref 3.5–5.1)
RBC # BLD AUTO: 5.18 M/UL (ref 4.6–6.2)
RSV AG BY MOLECULAR METHOD: NOT DETECTED
SARS-COV-2 RNA RESP QL NAA+PROBE: DETECTED
SODIUM SERPL-SCNC: 137 MMOL/L (ref 136–145)
WBC # BLD AUTO: 8.29 K/UL (ref 3.9–12.7)

## 2022-11-30 PROCEDURE — 85025 COMPLETE CBC W/AUTO DIFF WBC: CPT | Performed by: PHYSICIAN ASSISTANT

## 2022-11-30 PROCEDURE — 86803 HEPATITIS C AB TEST: CPT | Performed by: PHYSICIAN ASSISTANT

## 2022-11-30 PROCEDURE — 99285 PR EMERGENCY DEPT VISIT,LEVEL V: ICD-10-PCS | Mod: ,,, | Performed by: EMERGENCY MEDICINE

## 2022-11-30 PROCEDURE — 99285 EMERGENCY DEPT VISIT HI MDM: CPT | Mod: ,,, | Performed by: EMERGENCY MEDICINE

## 2022-11-30 PROCEDURE — 0241U SARS-COV2 (COVID) WITH FLU/RSV BY PCR: CPT | Performed by: PHYSICIAN ASSISTANT

## 2022-11-30 PROCEDURE — 96361 HYDRATE IV INFUSION ADD-ON: CPT

## 2022-11-30 PROCEDURE — 63600175 PHARM REV CODE 636 W HCPCS: Performed by: PHYSICIAN ASSISTANT

## 2022-11-30 PROCEDURE — 87389 HIV-1 AG W/HIV-1&-2 AB AG IA: CPT | Performed by: PHYSICIAN ASSISTANT

## 2022-11-30 PROCEDURE — 96374 THER/PROPH/DIAG INJ IV PUSH: CPT

## 2022-11-30 PROCEDURE — 99284 EMERGENCY DEPT VISIT MOD MDM: CPT | Mod: 25

## 2022-11-30 PROCEDURE — 80048 BASIC METABOLIC PNL TOTAL CA: CPT | Performed by: PHYSICIAN ASSISTANT

## 2022-11-30 PROCEDURE — 25000003 PHARM REV CODE 250: Performed by: PHYSICIAN ASSISTANT

## 2022-11-30 RX ORDER — ONDANSETRON 4 MG/1
4 TABLET, ORALLY DISINTEGRATING ORAL EVERY 8 HOURS PRN
Qty: 12 TABLET | Refills: 0 | Status: SHIPPED | OUTPATIENT
Start: 2022-11-30

## 2022-11-30 RX ORDER — ACETAMINOPHEN 325 MG/1
650 TABLET ORAL
Status: COMPLETED | OUTPATIENT
Start: 2022-11-30 | End: 2022-11-30

## 2022-11-30 RX ORDER — NAPROXEN 500 MG/1
500 TABLET ORAL
Status: COMPLETED | OUTPATIENT
Start: 2022-11-30 | End: 2022-11-30

## 2022-11-30 RX ORDER — ONDANSETRON 2 MG/ML
4 INJECTION INTRAMUSCULAR; INTRAVENOUS
Status: COMPLETED | OUTPATIENT
Start: 2022-11-30 | End: 2022-11-30

## 2022-11-30 RX ADMIN — SODIUM CHLORIDE 500 ML: 0.9 INJECTION, SOLUTION INTRAVENOUS at 08:11

## 2022-11-30 RX ADMIN — ACETAMINOPHEN 650 MG: 325 TABLET ORAL at 07:11

## 2022-11-30 RX ADMIN — ONDANSETRON 4 MG: 2 INJECTION INTRAMUSCULAR; INTRAVENOUS at 07:11

## 2022-11-30 RX ADMIN — NAPROXEN 500 MG: 500 TABLET ORAL at 07:11

## 2022-11-30 NOTE — ED NOTES
LOC: The patient is awake and alert; oriented x 3 and speaking appropriately.  APPEARANCE: Patient resting comfortably, patient is clean and well groomed  SKIN: warm and dry, normal skin turgor & moist mucus membranes, skin intact, no breakdown noted.  MUSCULOSKELETAL: Patient moving all extremities well, no obvious swelling or deformities noted  RESPIRATORY: Airway is open and patent, respirations are spontaneous, normal effort and rate  CARDIAC: Patient has a normal rate, no peripheral edema noted, capillary refill < 3 seconds; No complaints of chest pain   ABDOMEN: Soft and non tender to palpation, no distention noted.

## 2022-11-30 NOTE — ED PROVIDER NOTES
Encounter Date: 11/30/2022       History     Chief Complaint   Patient presents with    Generalized Body Aches     Generalized weakness and head congestion few days, taking theraflu     62-year-old male with no significant past medical history presents to the emergency department with chief complaint of headache, sinus pressure, congestion, productive cough with green sputum, fever, nausea, vomiting, diarrhea that began 6 days ago.  He does endorse sick contacts.  He is vaccinated for COVID, but not vaccinated for the flu.  He has been taking TheraFlu with some improvement of his symptoms.  He denies chest pain, shortness of breath, dysuria, abdominal pain.  He denies other worsening or alleviating factors.    Review of patient's allergies indicates:  No Known Allergies  Past Medical History:   Diagnosis Date    Cervical radiculopathy      No past surgical history on file.  No family history on file.  Social History     Tobacco Use    Smoking status: Never    Smokeless tobacco: Never   Substance Use Topics    Alcohol use: No     Alcohol/week: 1.0 standard drink     Types: 1 Cans of beer per week    Drug use: No     Review of Systems   Constitutional:  Positive for chills, fatigue and fever.   HENT:  Positive for congestion and sinus pressure. Negative for sore throat.    Respiratory:  Positive for cough. Negative for shortness of breath.    Cardiovascular:  Negative for chest pain.   Gastrointestinal:  Positive for diarrhea, nausea and vomiting.   Genitourinary:  Negative for dysuria.   Musculoskeletal:  Negative for back pain.   Skin:  Negative for rash.   Neurological:  Positive for headaches. Negative for weakness.   Hematological:  Does not bruise/bleed easily.     Physical Exam     Initial Vitals [11/30/22 0702]   BP Pulse Resp Temp SpO2   (!) 143/65 65 18 98.2 °F (36.8 °C) 100 %      MAP       --         Physical Exam    Nursing note and vitals reviewed.  Constitutional: He appears well-developed and  well-nourished. He is not diaphoretic. No distress.   HENT:   Head: Normocephalic and atraumatic.   Mouth/Throat: Oropharynx is clear and moist.   Eyes: EOM are normal. Pupils are equal, round, and reactive to light.   Neck: Neck supple.   Normal range of motion.  Cardiovascular:  Normal rate, regular rhythm, normal heart sounds and intact distal pulses.     Exam reveals no gallop and no friction rub.       No murmur heard.  Pulmonary/Chest: Breath sounds normal. He has no wheezes. He has no rhonchi. He has no rales.   Abdominal: Abdomen is soft. Bowel sounds are normal. There is no abdominal tenderness.   Musculoskeletal:         General: Normal range of motion.      Cervical back: Normal range of motion and neck supple.     Neurological: He is alert and oriented to person, place, and time. He has normal strength. GCS score is 15. GCS eye subscore is 4. GCS verbal subscore is 5. GCS motor subscore is 6.   Skin: Skin is warm and dry. Capillary refill takes less than 2 seconds.   Psychiatric: He has a normal mood and affect. His behavior is normal. Judgment and thought content normal.       ED Course   Procedures  Labs Reviewed   BASIC METABOLIC PANEL - Abnormal; Notable for the following components:       Result Value    Anion Gap 7 (*)     All other components within normal limits   SARS-COV2 (COVID) WITH FLU/RSV BY PCR - Abnormal; Notable for the following components:    SARS-CoV2 (COVID-19) Qualitative PCR Detected (*)     All other components within normal limits   HIV 1 / 2 ANTIBODY    Narrative:     Release to patient->Immediate   CBC W/ AUTO DIFFERENTIAL   HEPATITIS C ANTIBODY          Imaging Results              X-Ray Chest PA And Lateral (Final result)  Result time 11/30/22 08:42:03      Final result by Portillo Morejon MD (11/30/22 08:42:03)                   Impression:      No significant intrathoracic abnormality.  No significant detrimental interval change in the appearance of the chest since 02/14/2019  is appreciated.      Electronically signed by: Portillo Morejon MD  Date:    11/30/2022  Time:    08:42               Narrative:    EXAMINATION:  XR CHEST PA AND LATERAL    TECHNIQUE:  Two views of the chest were obtained, with PA and lateral projections submitted.    COMPARISON:  Comparison is made to 02/14/2019.  Clinical information of fever and myalgias.    FINDINGS:  Heart size is normal, as is the appearance of the pulmonary vascularity.  Lung zones are clear, and are free of significant airspace consolidation or volume loss.  No hilar or mediastinal mass lesion.  No pneumothorax.  Multilevel marginal vertebral endplate spurring is incidentally noted in the thoracic spine.                                    X-Rays:   Independently Interpreted Readings:   Chest X-Ray: Normal heart size.  No acute abnormalities.  No infiltrates.   Medications   sodium chloride 0.9% bolus 500 mL (0 mLs Intravenous Stopped 11/30/22 0854)   acetaminophen tablet 650 mg (650 mg Oral Given 11/30/22 0747)   naproxen tablet 500 mg (500 mg Oral Given 11/30/22 0747)   ondansetron injection 4 mg (4 mg Intravenous Given 11/30/22 0747)     Medical Decision Making:   History:   Old Medical Records: I decided to obtain old medical records.  Initial Assessment:   Emergent evaluation of a 62 y.o. male presenting to the emergency department complaining of headache, sinus pressure, congestion, productive cough with green sputum, fever, nausea, vomiting, diarrhea that began 6 days ago. Patient is afebrile, hemodynamically stable, and non toxic appearing.  Will order labs, imaging, swabs.    Differential Diagnosis:   Differential diagnosis includes but is not limited to COVID, influenza, pneumonia, viral syndrome.  Independently Interpreted Test(s):   I have ordered and independently interpreted X-rays - see prior notes.  I have ordered and independently interpreted EKG Reading(s) - see prior notes  Clinical Tests:   Lab Tests: Ordered and  Reviewed  Radiological Study: Ordered and Reviewed  Medical Tests: Ordered and Reviewed  ED Management:  No severe metabolic or hematologic derangements.  Chest x-ray without acute abnormality.  COVID swab is positive.  Patient's symptoms started 6 days ago.  He is instructed to isolate until he is no longer symptomatic without medication.  He is instructed to wear a mask.  He is out of the treatment window for Paxlovid.  He does not meet criteria for monoclonal antibody infusion.  Symptomatic care discussed.  Return precautions.  All questions answered.  The patient was instructed to follow up with a primary care provider or to return to the emergency department for worsening symptoms. The treatment plan was discussed with the patient who demonstrated understanding and comfort with plan.            ED Course as of 11/30/22 1046   Wed Nov 30, 2022   0837 WBC: 8.29 [JM]   0837 Hemoglobin: 14.3 [JM]   0837 Hematocrit: 43.5 [JM]   0837 Platelets: 263 [JM]   0837 BUN: 9 [JM]   0837 Creatinine: 1.0 [JM]      ED Course User Index  [JM] Tegan Toth PA-C                 Clinical Impression:   Final diagnoses:  [R05.9] Cough  [U07.1] COVID-19 (Primary)      ED Disposition Condition    Discharge Stable          ED Prescriptions       Medication Sig Dispense Start Date End Date Auth. Provider    ondansetron (ZOFRAN-ODT) 4 MG TbDL Take 1 tablet (4 mg total) by mouth every 8 (eight) hours as needed (nausea). 12 tablet 11/30/2022 -- Tegan Toth PA-C          Follow-up Information       Follow up With Specialties Details Why Contact Info    [facility] - Emergency Dept Emergency Medicine Go to  If symptoms worsen [id number]  [facility] [id number] [phone]    [facility] Family Medicine Schedule an appointment as soon as possible for a visit in 1 week  [address] [facility] [address]  [phone]               Tegan Toth PA-C  11/30/22 1046

## 2022-12-30 ENCOUNTER — OCCUPATIONAL HEALTH (OUTPATIENT)
Dept: URGENT CARE | Facility: CLINIC | Age: 62
End: 2022-12-30

## 2022-12-30 DIAGNOSIS — Z13.9 ENCOUNTER FOR SCREENING: Primary | ICD-10-CM

## 2022-12-30 PROCEDURE — 86580 POCT TB SKIN TEST: ICD-10-PCS | Mod: S$GLB,,, | Performed by: EMERGENCY MEDICINE

## 2022-12-30 PROCEDURE — 86580 TB INTRADERMAL TEST: CPT | Mod: S$GLB,,, | Performed by: EMERGENCY MEDICINE

## 2023-01-03 ENCOUNTER — OCCUPATIONAL HEALTH (OUTPATIENT)
Dept: URGENT CARE | Facility: CLINIC | Age: 63
End: 2023-01-03

## 2023-01-03 DIAGNOSIS — Z02.83 ENCOUNTER FOR DRUG SCREENING: Primary | ICD-10-CM

## 2023-01-03 PROCEDURE — 80305 OOH COLLECTION ONLY DRUG SCREEN: ICD-10-PCS | Mod: S$GLB,,, | Performed by: STUDENT IN AN ORGANIZED HEALTH CARE EDUCATION/TRAINING PROGRAM

## 2023-01-03 PROCEDURE — 80305 DRUG TEST PRSMV DIR OPT OBS: CPT | Mod: S$GLB,,, | Performed by: STUDENT IN AN ORGANIZED HEALTH CARE EDUCATION/TRAINING PROGRAM

## 2025-05-15 ENCOUNTER — HOSPITAL ENCOUNTER (EMERGENCY)
Facility: HOSPITAL | Age: 65
Discharge: HOME | End: 2025-05-15
Attending: STUDENT IN AN ORGANIZED HEALTH CARE EDUCATION/TRAINING PROGRAM
Payer: COMMERCIAL

## 2025-05-15 VITALS
SYSTOLIC BLOOD PRESSURE: 141 MMHG | HEART RATE: 52 BPM | RESPIRATION RATE: 16 BRPM | BODY MASS INDEX: 27.83 KG/M2 | OXYGEN SATURATION: 100 % | WEIGHT: 210 LBS | DIASTOLIC BLOOD PRESSURE: 74 MMHG | TEMPERATURE: 98 F | HEIGHT: 73 IN

## 2025-05-15 DIAGNOSIS — S61.411A LACERATION OF RIGHT HAND WITHOUT FOREIGN BODY, INITIAL ENCOUNTER: Primary | ICD-10-CM

## 2025-05-15 PROCEDURE — 90715 TDAP VACCINE 7 YRS/> IM: CPT | Mod: ER | Performed by: STUDENT IN AN ORGANIZED HEALTH CARE EDUCATION/TRAINING PROGRAM

## 2025-05-15 PROCEDURE — 12001 RPR S/N/AX/GEN/TRNK 2.5CM/<: CPT | Mod: ER

## 2025-05-15 PROCEDURE — 99283 EMERGENCY DEPT VISIT LOW MDM: CPT | Mod: 25,ER

## 2025-05-15 PROCEDURE — 96372 THER/PROPH/DIAG INJ SC/IM: CPT | Performed by: STUDENT IN AN ORGANIZED HEALTH CARE EDUCATION/TRAINING PROGRAM

## 2025-05-15 PROCEDURE — 25000003 PHARM REV CODE 250: Mod: ER | Performed by: NURSE PRACTITIONER

## 2025-05-15 PROCEDURE — 63600175 PHARM REV CODE 636 W HCPCS: Mod: ER | Performed by: NURSE PRACTITIONER

## 2025-05-15 PROCEDURE — 63600175 PHARM REV CODE 636 W HCPCS: Mod: ER | Performed by: STUDENT IN AN ORGANIZED HEALTH CARE EDUCATION/TRAINING PROGRAM

## 2025-05-15 PROCEDURE — 90471 IMMUNIZATION ADMIN: CPT | Mod: ER | Performed by: STUDENT IN AN ORGANIZED HEALTH CARE EDUCATION/TRAINING PROGRAM

## 2025-05-15 RX ORDER — LIDOCAINE HYDROCHLORIDE 10 MG/ML
10 INJECTION, SOLUTION INFILTRATION; PERINEURAL
Status: COMPLETED | OUTPATIENT
Start: 2025-05-15 | End: 2025-05-15

## 2025-05-15 RX ORDER — MUPIROCIN 20 MG/G
OINTMENT TOPICAL 2 TIMES DAILY
Qty: 15 G | Refills: 0 | Status: SHIPPED | OUTPATIENT
Start: 2025-05-15 | End: 2025-05-15 | Stop reason: ALTCHOICE

## 2025-05-15 RX ADMIN — CLOSTRIDIUM TETANI TOXOID ANTIGEN (FORMALDEHYDE INACTIVATED), CORYNEBACTERIUM DIPHTHERIAE TOXOID ANTIGEN (FORMALDEHYDE INACTIVATED), BORDETELLA PERTUSSIS TOXOID ANTIGEN (GLUTARALDEHYDE INACTIVATED), BORDETELLA PERTUSSIS FILAMENTOUS HEMAGGLUTININ ANTIGEN (FORMALDEHYDE INACTIVATED), BORDETELLA PERTUSSIS PERTACTIN ANTIGEN, AND BORDETELLA PERTUSSIS FIMBRIAE 2/3 ANTIGEN 0.5 ML: 5; 2; 2.5; 5; 3; 5 INJECTION, SUSPENSION INTRAMUSCULAR at 09:05

## 2025-05-15 RX ADMIN — BACITRACIN ZINC, NEOMYCIN, POLYMYXIN B 1 EACH: 400; 3.5; 5 OINTMENT TOPICAL at 09:05

## 2025-05-15 RX ADMIN — LIDOCAINE HYDROCHLORIDE 10 ML: 10 INJECTION, SOLUTION INFILTRATION; PERINEURAL at 09:05

## 2025-05-16 NOTE — ED PROVIDER NOTES
Encounter Date: 5/15/2025       History     Chief Complaint   Patient presents with    Laceration     Pt reports laceration on the right hand an hour ago PTA.      CC: Laceration    HPI:  This is a 64-year-old male presenting to ED for evaluation of laceration to the right hand sustained prior to arrival.  Patient was pushing a trailer full of hay, when he was cut on the hand by a piece of metal.  He is right-handed.  Unknown last tetanus shot.  Denies numbness or tingling.    The history is provided by the patient. No  was used.     Review of patient's allergies indicates:  No Known Allergies  Past Medical History:   Diagnosis Date    Cervical radiculopathy      History reviewed. No pertinent surgical history.  No family history on file.  Social History[1]  Review of Systems   Constitutional:  Negative for chills and fever.   HENT:  Negative for sore throat.    Respiratory:  Negative for shortness of breath.    Cardiovascular:  Negative for chest pain.   Gastrointestinal:  Negative for nausea.   Genitourinary:  Negative for dysuria.   Musculoskeletal:  Negative for back pain.   Skin:  Positive for wound. Negative for rash.   Neurological:  Negative for weakness.   Hematological:  Does not bruise/bleed easily.       Physical Exam     Initial Vitals [05/15/25 1937]   BP Pulse Resp Temp SpO2   (!) 152/78 74 16 98 °F (36.7 °C) 98 %      MAP       --         Physical Exam    Vitals reviewed.  Constitutional: He appears well-developed and well-nourished. He is not diaphoretic.  Non-toxic appearance. He does not have a sickly appearance. He does not appear ill. No distress.   HENT:   Head: Normocephalic and atraumatic.   Right Ear: External ear normal.   Left Ear: External ear normal.   Neck:   Normal range of motion.  Cardiovascular:  Intact distal pulses.           Pulmonary/Chest: No respiratory distress.   Musculoskeletal:         General: Normal range of motion.      Cervical back: Normal range of  motion.      Comments: 2cm linear laceration to dorsal aspect right hand overlying 5th metacarpal.  Full range motion of all digits against resistance.  Brisk capillary refill of all digits.     Neurological: He is alert and oriented to person, place, and time. GCS eye subscore is 4. GCS verbal subscore is 5. GCS motor subscore is 6.   Skin: Skin is warm and dry. Laceration noted. No rash noted. No erythema.   Psychiatric: He has a normal mood and affect. Thought content normal.         ED Course   Lac Repair    Date/Time: 5/16/2025 12:17 AM    Performed by: Wendie Resendez NP  Authorized by: Karthikeyan Oconnell MD    Anesthesia:     Anesthesia method:  Local infiltration    Local anesthetic:  Lidocaine 1% w/o epi  Laceration details:     Location:  Hand    Hand location:  R hand, dorsum    Length (cm):  2  Exploration:     Hemostasis achieved with:  Direct pressure    Imaging outcome: foreign body not noted      Wound exploration: wound explored through full range of motion and entire depth of wound visualized      Wound extent: no foreign bodies/material noted, no muscle damage noted and no tendon damage noted    Treatment:     Area cleansed with:  Saline    Amount of cleaning:  Standard    Irrigation solution:  Sterile saline  Skin repair:     Repair method:  Sutures    Suture size:  4-0    Suture material:  Nylon    Suture technique:  Simple interrupted    Number of sutures:  4  Approximation:     Approximation:  Close  Post-procedure details:     Dressing:  Bulky dressing    Procedure completion:  Tolerated well, no immediate complications    Labs Reviewed - No data to display       Imaging Results    None          Medications   Tdap vaccine injection 0.5 mL (0.5 mLs Intramuscular Given 5/15/25 2107)   LIDOcaine HCL 10 mg/ml (1%) injection 10 mL (10 mLs Infiltration Given by Provider 5/15/25 2108)   neomycin-bacitracnZn-polymyxnB packet (1 each Topical (Top) Given 5/15/25 2140)     Medical Decision Making  This  is an evaluation of a 64 y.o. male that presents to the Emergency Department for a Laceration. Physical Exam shows a non-toxic, afebrile, and well appearing male. There is a laceration to right hand. There is no surrounding erythema or area of increased warmth. All compartments are soft. There is full ROM of digits against resistance. Equal sensation to the extremity. No visible tendon lacerations observed on exam.  The wound was irrigated and visually inspected prior to closure. No visible foreign bodies noted. Vital Signs Are Reassuring. Tetanus Was updated.    The wound was closed per the procedure note.     My overall impression is Laceration. I considered, but at this time, do not suspect cellulitis, compartment syndrome, underlying fracture, tendon injury, or suspect any retained foreign body at this time.     D/C Information: Laceration/Wound Care/Suture Removal instructions given. The diagnosis, treatment plan, instructions for follow-up and reevaluation with his PCP or Return to ED in 7-10 days for suture removal as well as ED return precautions were discussed and understanding was verbalized. All questions or concerns have been addressed.        Risk  OTC drugs.  Prescription drug management.                                      Clinical Impression:  Final diagnoses:  [S61.411A] Laceration of right hand without foreign body, initial encounter (Primary)          ED Disposition Condition    Discharge Stable          ED Prescriptions       Medication Sig Dispense Start Date End Date Auth. Provider    mupirocin (BACTROBAN) 2 % ointment  (Status: Discontinued) Apply topically 2 (two) times daily. 15 g 5/15/2025 5/15/2025 Wendie Resendez NP          Follow-up Information       Follow up With Specialties Details Why Contact [facility] -  Schedule an appointment as soon as possible for a visit in 1 day For follow-up if you do not have a primary care doctor [address] [place]  [address]  [phone]      [facility] ED Emergency Medicine Go to  If symptoms worsen [address] [facility] [address] [phone]                 [1]   Social History  Tobacco Use    Smoking status: Never    Smokeless tobacco: Never   Substance Use Topics    Alcohol use: No     Alcohol/week: 1.0 standard drink of alcohol     Types: 1 Cans of beer per week    Drug use: No        Wendie Resendez NP  05/16/25 0019

## 2025-05-16 NOTE — ED NOTES
Pt presents secondary to a laceration to the dorsal side of the right hand. Pt states injury occurred approx. 2hrs pta while moving hay. Dressing placed in triage, no active drainage appreciated at this time. Last tetanus unknown. Pt reports putting neosporin on the wound immediately and denies taking any medication for pain pta.

## 2025-05-16 NOTE — DISCHARGE INSTRUCTIONS
Please keep your wound clean and dry.  Wash gently with soap and water and apply antibiotic ointment (bacitracin, neosporin, etc.) over the wound after washing. Please watch for signs of infection including: increased\spreading redness, swelling, pus-like discharge, or a fever greater than 100.4F. If you experience any of these, please contact your Primary Care Doctor or Return to the Emergency Department for a wound check.     Please follow up with your Primary Care Doctor in 7-10 days for wound recheck and suture removal.  You may return to the Emergency Department if you are unable to see your Primary Care Doctor.  Please return to the ER for any new or worsening symptoms.

## 2025-05-28 ENCOUNTER — HOSPITAL ENCOUNTER (EMERGENCY)
Facility: HOSPITAL | Age: 65
Discharge: HOME | End: 2025-05-28
Attending: EMERGENCY MEDICINE
Payer: COMMERCIAL

## 2025-05-28 VITALS
HEIGHT: 73 IN | TEMPERATURE: 98 F | WEIGHT: 210 LBS | HEART RATE: 64 BPM | OXYGEN SATURATION: 100 % | SYSTOLIC BLOOD PRESSURE: 123 MMHG | RESPIRATION RATE: 18 BRPM | DIASTOLIC BLOOD PRESSURE: 76 MMHG | BODY MASS INDEX: 27.83 KG/M2

## 2025-05-28 DIAGNOSIS — Z48.02 VISIT FOR SUTURE REMOVAL: Primary | ICD-10-CM

## 2025-05-28 DIAGNOSIS — Z51.89 VISIT FOR WOUND CHECK: ICD-10-CM

## 2025-05-28 PROCEDURE — 25000003 PHARM REV CODE 250: Mod: ER | Performed by: EMERGENCY MEDICINE

## 2025-05-28 PROCEDURE — 99999 HC NO LEVEL OF SERVICE - ED ONLY: CPT | Mod: ER

## 2025-05-28 RX ORDER — MUPIROCIN 20 MG/G
OINTMENT TOPICAL 3 TIMES DAILY
Qty: 60 G | Refills: 0 | Status: SHIPPED | OUTPATIENT
Start: 2025-05-28 | End: 2025-06-07

## 2025-05-28 RX ADMIN — BACITRACIN ZINC, NEOMYCIN, POLYMYXIN B 1 EACH: 400; 3.5; 5 OINTMENT TOPICAL at 01:05

## 2025-05-29 NOTE — ED PROVIDER NOTES
Encounter Date: 5/28/2025       History     Chief Complaint   Patient presents with    Suture / Staple Removal     Stitches removed from right hand      64-year-old male with past medical history so cervical radiculopathy presents with chief complaint of suture removal.  Patient reports sutures were placed a few weeks ago.  Chart review shows the replaced on 05/15/2025.  Patient states there has been 4 sutures however 1 came out on its own.  Currently 3 stitches in place.  Patient states he is pleased with the healing.  Patient reports he is a  so it took him a little longer than recommended to come back in to have sutures removed.  Patient states the area is not painful.    The history is provided by the patient. A  was used.     Review of patient's allergies indicates:  No Known Allergies  Past Medical History:   Diagnosis Date    Cervical radiculopathy      No past surgical history on file.  No family history on file.  Social History[1]  Review of Systems   Constitutional:  Negative for fever.   HENT:  Negative for sore throat.    Respiratory:  Negative for shortness of breath.    Cardiovascular:  Negative for chest pain.   Gastrointestinal:  Negative for nausea.   Genitourinary:  Negative for dysuria.   Musculoskeletal:  Negative for back pain.   Skin:  Positive for wound. Negative for rash.        Needs wound checked and sutures removed   Neurological:  Negative for weakness.   Hematological:  Does not bruise/bleed easily.   All other systems reviewed and are negative.      Physical Exam     Initial Vitals [05/28/25 1314]   BP Pulse Resp Temp SpO2   123/76 64 18 98.3 °F (36.8 °C) 100 %      MAP       --         Physical Exam    Nursing note and vitals reviewed.  Constitutional: He appears well-developed and well-nourished.   HENT:   Head: Normocephalic and atraumatic.   Right Ear: External ear normal.   Left Ear: External ear normal.   Eyes: EOM are normal. Pupils are equal,  round, and reactive to light.   Neck: Neck supple.   Normal range of motion.  Cardiovascular:  Normal rate, regular rhythm and intact distal pulses.           Pulmonary/Chest: Breath sounds normal. No respiratory distress.   Musculoskeletal:         General: No tenderness or edema. Normal range of motion.      Cervical back: Normal range of motion and neck supple.     Neurological: He is alert. He has normal strength.   Skin: Skin is warm and dry. Capillary refill takes less than 2 seconds. No rash noted. No pallor.   Wound is clean dry intact.  Three sutures in place.  Three sutures removed with success.  No complications.   Psychiatric: He has a normal mood and affect.         ED Course   Suture Removal    Date/Time: 5/29/2025 7:55 AM  Location procedure was performed: [facility] EMERGENCY DEPARTMENT    Performed by: Aleja Frias DO  Authorized by: Aleja Frias DO  Body area: upper extremity  Location details: right hand  Wound Appearance: clean, well healed, normal color and no drainage  Sutures Removed: 3  Staples Removed: 0  Post-removal: bandaid applied and antibiotic ointment applied  Facility: sutures placed in this facility  Complications: No  Specimens: No  Implants: No  Patient tolerance: Patient tolerated the procedure well with no immediate complications        Labs Reviewed - No data to display       Imaging Results    None          Medications   neomycin-bacitracnZn-polymyxnB packet (1 each Topical (Top) Given 5/28/25 1345)     Medical Decision Making  Risk  OTC drugs.  Prescription drug management.               ED Course as of 05/29/25 0756   Wed May 28, 2025   1338 Three sutures successfully removed.  Wound is clean dry and intact.  I visually appreciate the area where the for suture had been however it fill out per patient. [RF]      ED Course User Index  [RF] Aleja Frias DO          Medical Decision Making:    This is an evaluation of a 64 y.o. male that presents to the Emergency Department for    Chief Complaint   Patient presents with    Suture / Staple Removal     Stitches removed from right hand        The patient is a non-toxic and well appearing patient. On physical exam, patient appears well hydrated with moist mucus membranes. Breath sounds are clear and equal bilaterally with no adventitious breath sounds, tachypnea or respiratory distress. Regular rate and rhythm. No murmurs.Patient is tolerating PO without difficulty. Physical exam otherwise as above.     I have reviewed vital signs and nursing notes.   Vital Signs Are Reassuring.     Based on the patient's symptoms, I am considering and evaluating for the following differential diagnoses:  Cellulitis, wound check, and suture removal  ED Course:Treatment in the ED included Physical Exam and medications given in ED  Medications   neomycin-bacitracnZn-polymyxnB packet (1 each Topical (Top) Given 5/28/25 1345)   .   Patient reports feeling better after treatment in the ER.   Vital signs reviewed  Nurse's notes reviewed  External Data/Documents Reviewed: Previous medical records and vital signs reviewed, see HPI and Physical exam.       Risk  Diagnosis or treatment significantly limited by the following social determinants of health: Body mass index is 27.71 kg/m².     In shared decision making with the patient, we discussed treatment, prescriptions, and wound care.    Discharge home with   ED Prescriptions       Medication Sig Dispense Start Date End Date Auth. Provider    mupirocin (BACTROBAN) 2 % ointment Apply topically 3 (three) times daily. for 10 days 60 g 5/28/2025 6/7/2025 Aleja Frias DO          Fill and take prescriptions as directed.  Return to the ED if symptoms worsen or do not resolve.   Answered questions and discussed discharge plan.    Patient reports resolution of symptoms and is ready for discharge.  Follow up with PCP/specialist in 1 day    The following labs and imaging were reviewed:      No visits with results within 1  Day(s) from this visit.   Latest known visit with results is:   Admission on 11/30/2022, Discharged on 11/30/2022   Component Date Value Ref Range Status    HIV 1/2 Ag/Ab 11/30/2022 Non-reactive  Non-reactive Final    Hepatitis C Ab 11/30/2022 Non-reactive  Non-reactive Final    WBC 11/30/2022 8.29  3.90 - 12.70 K/uL Final    RBC 11/30/2022 5.18  4.60 - 6.20 M/uL Final    Hemoglobin 11/30/2022 14.3  14.0 - 18.0 g/dL Final    Hematocrit 11/30/2022 43.5  40.0 - 54.0 % Final    MCV 11/30/2022 84  82 - 98 fL Final    MCH 11/30/2022 27.6  27.0 - 31.0 pg Final    MCHC 11/30/2022 32.9  32.0 - 36.0 g/dL Final    RDW 11/30/2022 12.7  11.5 - 14.5 % Final    Platelets 11/30/2022 263  150 - 450 K/uL Final    MPV 11/30/2022 10.2  9.2 - 12.9 fL Final    Immature Granulocytes 11/30/2022 0.4  0.0 - 0.5 % Final    Gran # (ANC) 11/30/2022 5.6  1.8 - 7.7 K/uL Final    Immature Grans (Abs) 11/30/2022 0.03  0.00 - 0.04 K/uL Final    Comment: Mild elevation in immature granulocytes is non specific and   can be seen in a variety of conditions including stress response,   acute inflammation, trauma and pregnancy. Correlation with other   laboratory and clinical findings is essential.      Lymph # 11/30/2022 2.0  1.0 - 4.8 K/uL Final    Mono # 11/30/2022 0.6  0.3 - 1.0 K/uL Final    Eos # 11/30/2022 0.0  0.0 - 0.5 K/uL Final    Baso # 11/30/2022 0.04  0.00 - 0.20 K/uL Final    nRBC 11/30/2022 0  0 /100 WBC Final    Gran % 11/30/2022 67.0  38.0 - 73.0 % Final    Lymph % 11/30/2022 24.0  18.0 - 48.0 % Final    Mono % 11/30/2022 7.7  4.0 - 15.0 % Final    Eosinophil % 11/30/2022 0.4  0.0 - 8.0 % Final    Basophil % 11/30/2022 0.5  0.0 - 1.9 % Final    Differential Method 11/30/2022 Automated   Final    Sodium 11/30/2022 137  136 - 145 mmol/L Final    Potassium 11/30/2022 4.0  3.5 - 5.1 mmol/L Final    Chloride 11/30/2022 103  95 - 110 mmol/L Final    CO2 11/30/2022 27  23 - 29 mmol/L Final    Glucose 11/30/2022 92  70 - 110 mg/dL Final    BUN  11/30/2022 9  8 - 23 mg/dL Final    Creatinine 11/30/2022 1.0  0.5 - 1.4 mg/dL Final    Calcium 11/30/2022 9.4  8.7 - 10.5 mg/dL Final    Anion Gap 11/30/2022 7 (L)  8 - 16 mmol/L Final    eGFR 11/30/2022 >60.0  >60 mL/min/1.73 m^2 Final    SARS-CoV2 (COVID-19) Qualitative P* 11/30/2022 Detected (A)  Not Detected Final    Comment: This test utilizes a real-time reverse transcription  polymerase chain reaction procedure to amplify and   detect the SARS-CoV-2 and detect the SARS-CoV-2 N2 and E nucleic  acid targets. The analytical sensitivity (limit of detection) of   this assay is 250 copies/mL.    A Detected result implies that the patient is infected with the  SARS-CoV-2 virus and is presumed to be contagious.    A Not Detected result implies that the SARS-CoV-2 target nucleic  acids are not present above the limit of detection.  It does not  rule out the possibility of COVID-19 and should not be the sole  basis for treatment decisions. If COVID-19 is strongly suspected  based on clinical and epidemiological history, re-testing should  be considered.    This test is only for use under Food and Drug   Administration s Emergency Use Authorization (EUA).   Commercial reagents are provided by [employer].  Performance characteristics of the EUA have been   independently verified by [facility]   Department o                           f Pathology and Laboratory Medicine.        Influenza A, Molecular 11/30/2022 Not Detected  Not Detected Final    Influenza B, Molecular 11/30/2022 Not Detected  Not Detected Final    RSV Ag by Molecular Method 11/30/2022 Not Detected  Not Detected Final        Imaging Results    None                        Clinical Impression:  Final diagnoses:  [Z48.02] Visit for suture removal (Primary)  [Z51.89] Visit for wound check          ED Disposition Condition    Discharge Stable          ED Prescriptions       Medication Sig Dispense Start Date End Date Auth. Provider    mupirocin  (BACTROBAN) 2 % ointment Apply topically 3 (three) times daily. for 10 days 60 g 5/28/2025 6/7/2025 Aleja Frias DO          Follow-up Information       Follow up With Specialties Details Why Contact Info    Jody Clemente MD Internal Medicine, Wound Care Schedule an appointment as soon as possible for a visit  Please establish a primary care physician [address] [place] [address]  [address]  [facility] [phone]      [facility] ED Emergency Medicine  As needed [address] [facility] [address] [phone]                 Aleja Frias DO  05/29/25 0756         [1]   Social History  Tobacco Use    Smoking status: Never    Smokeless tobacco: Never   Substance Use Topics    Alcohol use: No     Alcohol/week: 1.0 standard drink of alcohol     Types: 1 Cans of beer per week    Drug use: No        Aleja Frias DO  05/29/25 0759